# Patient Record
Sex: MALE | Race: BLACK OR AFRICAN AMERICAN | Employment: OTHER | URBAN - METROPOLITAN AREA
[De-identification: names, ages, dates, MRNs, and addresses within clinical notes are randomized per-mention and may not be internally consistent; named-entity substitution may affect disease eponyms.]

---

## 2017-08-29 ENCOUNTER — TELEPHONE (OUTPATIENT)
Dept: ORTHOPEDICS CLINIC | Facility: CLINIC | Age: 67
End: 2017-08-29

## 2017-08-29 ENCOUNTER — HOSPITAL ENCOUNTER (OUTPATIENT)
Age: 67
Discharge: HOME | End: 2017-08-29
Attending: EMERGENCY MEDICINE
Payer: MEDICARE

## 2017-08-29 VITALS
SYSTOLIC BLOOD PRESSURE: 153 MMHG | WEIGHT: 217 LBS | RESPIRATION RATE: 16 BRPM | HEART RATE: 75 BPM | BODY MASS INDEX: 31 KG/M2 | DIASTOLIC BLOOD PRESSURE: 83 MMHG | TEMPERATURE: 98 F | OXYGEN SATURATION: 99 %

## 2017-08-29 DIAGNOSIS — G89.29 CHRONIC PAIN OF RIGHT KNEE: ICD-10-CM

## 2017-08-29 DIAGNOSIS — Z01.10 ENCOUNTER FOR EXAMINATION OF EARS WITHOUT ABNORMAL FINDINGS: Primary | ICD-10-CM

## 2017-08-29 DIAGNOSIS — M25.561 CHRONIC PAIN OF RIGHT KNEE: ICD-10-CM

## 2017-08-29 PROCEDURE — 99212 OFFICE O/P EST SF 10 MIN: CPT

## 2017-08-29 NOTE — TELEPHONE ENCOUNTER
pt called. Right knee swollen and pain. Looks like fluid, no injury. Was seen today at [id number]. Please advise.

## 2017-08-29 NOTE — ED PROVIDER NOTES
Patient Seen in: [facility] In [address] [place] [address]    History   Patient presents with:  Ear Problem Pain (neurosensory)    Stated Complaint: Ears Plugged    HPI    The patient is a 69-year-old male with a history of chronic hearing loss and ti daily. Coconut Oil 1000 MG Oral Cap,  Take 2,000 mg by mouth daily. Venlafaxine HCl 37.5 MG Oral Tab,  Take 37.5 mg by mouth once daily.    tamsulosin HCl 0.4 MG Oral Cap,  TAKE ONE CAPSULE BY MOUTH AT BEDTIME   gabapentin 100 MG Oral Cap,  Take 3 cap and intact    ED Course   Labs Reviewed - No data to display    ============================================================  ED Course  ------------------------------------------------------------  MDM     Patient has chronic knee pain for which he needs

## 2017-08-29 NOTE — ED INITIAL ASSESSMENT (HPI)
Sent here for wax removal of ear and would like a work up on knees and arms . sent here by hearing aide people to have wax cleaned out and as long as here to check his chronic arthritis check

## 2017-08-29 NOTE — TELEPHONE ENCOUNTER
Spoke to pt. States right knee has swelling and pain. Went to [facility] today. No xrays taken. Onset 20 -30 yrs ago. States last week knee started swelling to twice size of other knee. Denies any recent injuries. States knee has stiffness.  Denies any numbness

## 2017-09-12 ENCOUNTER — HOSPITAL ENCOUNTER (OUTPATIENT)
Dept: GENERAL RADIOLOGY | Facility: HOSPITAL | Age: 67
Discharge: HOME | End: 2017-09-12
Attending: ORTHOPAEDIC SURGERY | Admitting: ORTHOPAEDIC SURGERY
Payer: MEDICARE

## 2017-09-12 ENCOUNTER — OFFICE VISIT (OUTPATIENT)
Dept: ORTHOPEDICS CLINIC | Facility: CLINIC | Age: 67
End: 2017-09-12

## 2017-09-12 DIAGNOSIS — M25.561 RIGHT KNEE PAIN, UNSPECIFIED CHRONICITY: ICD-10-CM

## 2017-09-12 DIAGNOSIS — M17.11 PRIMARY OSTEOARTHRITIS OF RIGHT KNEE: Primary | ICD-10-CM

## 2017-09-12 PROCEDURE — 73565 X-RAY EXAM OF KNEES: CPT | Performed by: ORTHOPAEDIC SURGERY

## 2017-09-12 PROCEDURE — 20610 DRAIN/INJ JOINT/BURSA W/O US: CPT | Performed by: ORTHOPAEDIC SURGERY

## 2017-09-12 PROCEDURE — 73560 X-RAY EXAM OF KNEE 1 OR 2: CPT | Performed by: ORTHOPAEDIC SURGERY

## 2017-09-12 PROCEDURE — 99214 OFFICE O/P EST MOD 30 MIN: CPT | Performed by: ORTHOPAEDIC SURGERY

## 2017-09-12 RX ORDER — MAGNESIUM OXIDE 400 MG (241.3 MG MAGNESIUM) TABLET
400 TABLET 2 TIMES DAILY
COMMUNITY

## 2017-09-12 RX ORDER — CHROMIUM PICOLINATE 200 MCG
CAPSULE ORAL
COMMUNITY

## 2017-09-12 NOTE — PROGRESS NOTES
Per verbal order from [facility], draw up 4ml of 1% lidocaine and 2ml of Kenalog 10 for cortisone injection to right knee.  Karla Camejo RN

## 2017-09-12 NOTE — PROGRESS NOTES
9/12/2017  Santos HashParade Sr.  61/1950  75year old   male  Girish Lackey MD    HPI:   Patient presents with:  Knee Pain: Right- pt states pain/swelling has been going on for yrs, but starting flaring up a  few wks ago. pt denies any injury.      [place] Snyder Tab Take 10 mg by mouth daily. Disp:  Rfl:    Omega 3 1000 MG Oral Cap Take  by mouth daily. Disp:  Rfl:    Coenzyme Q10 (COQ10) 100 MG Oral Cap Take  by mouth daily. Disp:  Rfl:    Coconut Oil 1000 MG Oral Cap Take 2,000 mg by mouth daily.    Disp:  Rfl: brisk capillary refill and 2+ distal pulses. Sensation is intact to light touch in superficial peroneal, deep peroneal, sural, saphenous, and tibial nerve distributions.   The patient has 5/5 strength in tibialis anterior, gastrocsoleus complex, EHL, and F

## 2017-12-13 ENCOUNTER — LAB ENCOUNTER (OUTPATIENT)
Dept: LAB | Age: 67
End: 2017-12-13
Attending: INTERNAL MEDICINE
Payer: MEDICARE

## 2017-12-13 DIAGNOSIS — Z01.818 PRE-OP TESTING: Primary | ICD-10-CM

## 2017-12-13 DIAGNOSIS — Z01.812 PRE-OPERATIVE LABORATORY EXAMINATION: ICD-10-CM

## 2017-12-13 PROCEDURE — 80053 COMPREHEN METABOLIC PANEL: CPT

## 2017-12-13 PROCEDURE — 36415 COLL VENOUS BLD VENIPUNCTURE: CPT

## 2017-12-13 PROCEDURE — 85025 COMPLETE CBC W/AUTO DIFF WBC: CPT

## 2017-12-13 PROCEDURE — 93005 ELECTROCARDIOGRAM TRACING: CPT

## 2017-12-13 PROCEDURE — 85610 PROTHROMBIN TIME: CPT

## 2017-12-13 PROCEDURE — 93010 ELECTROCARDIOGRAM REPORT: CPT | Performed by: INTERNAL MEDICINE

## 2017-12-13 PROCEDURE — 81003 URINALYSIS AUTO W/O SCOPE: CPT

## 2018-03-11 ENCOUNTER — HOSPITAL ENCOUNTER (EMERGENCY)
Facility: HOSPITAL | Age: 68
Discharge: HOME | End: 2018-03-11
Payer: MEDICARE

## 2018-03-11 ENCOUNTER — APPOINTMENT (OUTPATIENT)
Dept: GENERAL RADIOLOGY | Facility: HOSPITAL | Age: 68
End: 2018-03-11
Payer: MEDICARE

## 2018-03-11 VITALS
SYSTOLIC BLOOD PRESSURE: 129 MMHG | HEART RATE: 78 BPM | OXYGEN SATURATION: 99 % | TEMPERATURE: 98 F | BODY MASS INDEX: 35.16 KG/M2 | DIASTOLIC BLOOD PRESSURE: 90 MMHG | WEIGHT: 224 LBS | HEIGHT: 67 IN | RESPIRATION RATE: 18 BRPM

## 2018-03-11 DIAGNOSIS — M25.562 ARTHRALGIA OF LEFT LOWER LEG: Primary | ICD-10-CM

## 2018-03-11 PROCEDURE — 99283 EMERGENCY DEPT VISIT LOW MDM: CPT

## 2018-03-11 PROCEDURE — 73610 X-RAY EXAM OF ANKLE: CPT

## 2018-03-11 RX ORDER — PREGABALIN 75 MG/1
75 CAPSULE ORAL 2 TIMES DAILY
Qty: 60 CAPSULE | Refills: 0 | Status: SHIPPED | OUTPATIENT
Start: 2018-03-11 | End: 2018-04-10

## 2018-03-11 NOTE — ED INITIAL ASSESSMENT (HPI)
Pt here c/o pain to the outer left ankle area and side of leg x 3 wks. sts the pain is intermittent and usually lasts several minutes and goes away. The pain has continued and is more noticeable. Denies recent injury.   sts a couple days ago needed to us

## 2018-03-11 NOTE — ED NOTES
Pt is c/o left leg for the past 3 weeks, pt states his pain usually starts at 0400 in the morning and usually resolves on its own. Pt denies trauma, swelling or pain at this time. Pt is awake and alert. No acute distress noted. + pedal pulse.

## 2018-03-12 NOTE — ED PROVIDER NOTES
Patient Seen in: [facility] AND [facility] Emergency Department    History   Patient presents with:  Lower Extremity Injury (musculoskeletal)      HPI    Patient presents to the ED complaining of pain in his left lateral upper ankle area that occurs intermittent Right wrist splint     The patient does live in a home with stairs. ROS  Pertinent Positives: Leg pain  All other organ systems are reviewed and are negative. Constitutional and vital signs reviewed.       Social History and Family History acacia Height: 170.2 cm (5' 7\")      *I personally reviewed and interpreted all ED vitals.     Pulse Ox: 99%, Room air, Normal     Differential Diagnosis/ Diagnostic Considerations: Leg pain    Medical Record Review: I personally reviewed available prior medica

## 2018-06-18 ENCOUNTER — HOSPITAL ENCOUNTER (EMERGENCY)
Facility: HOSPITAL | Age: 68
Discharge: HOME | End: 2018-06-18
Payer: MEDICARE

## 2018-06-18 ENCOUNTER — APPOINTMENT (OUTPATIENT)
Dept: GENERAL RADIOLOGY | Facility: HOSPITAL | Age: 68
End: 2018-06-18
Payer: MEDICARE

## 2018-06-18 VITALS
TEMPERATURE: 98 F | DIASTOLIC BLOOD PRESSURE: 75 MMHG | SYSTOLIC BLOOD PRESSURE: 141 MMHG | BODY MASS INDEX: 33.49 KG/M2 | HEIGHT: 68 IN | OXYGEN SATURATION: 97 % | RESPIRATION RATE: 16 BRPM | HEART RATE: 68 BPM | WEIGHT: 221 LBS

## 2018-06-18 DIAGNOSIS — S90.112A CONTUSION OF LEFT GREAT TOE WITHOUT DAMAGE TO NAIL, INITIAL ENCOUNTER: ICD-10-CM

## 2018-06-18 DIAGNOSIS — S93.512A SPRAIN OF INTERPHALANGEAL JOINT OF LEFT GREAT TOE, INITIAL ENCOUNTER: Primary | ICD-10-CM

## 2018-06-18 PROCEDURE — 99283 EMERGENCY DEPT VISIT LOW MDM: CPT

## 2018-06-18 PROCEDURE — 73630 X-RAY EXAM OF FOOT: CPT

## 2018-06-18 NOTE — ED PROVIDER NOTES
Patient Seen in: [facility] AND [facility] Emergency Department    History   Patient presents with:  Lower Extremity Injury (musculoskeletal)    Stated Complaint: left foot pain    HPI    Patient presents because he did hit his first toe and just wanted to get i ASTAXANTHIN OR,  Take 12 mg by mouth daily. COLLAGEN OR,  Take 3 capsules by mouth 2 (two) times daily. Turmeric 500 MG Oral Cap,  Take 1,000 mg by mouth daily. Vitamin B-12 (VITAMIN B12) 250 MCG Oral Tab,  Take 250 mcg by mouth daily.    AmLODIPine B to the midfoot or metatarsal very tender to the IP joint less tenderness to the distal.  The nail is in place. No other trauma noted to the other toes  Skin:  Warm, dry, well perfused. Good skin turgor. No rashes seen.   Neurology:  Moving all extremitie

## 2018-06-18 NOTE — ED INITIAL ASSESSMENT (HPI)
Pt has a left toe injury from tripping over a vacuum cord. Pt denies LOC. Pt reports left great toe pain. Pt has toe bandaged and blood is noted.  Pt denies being on a blood thinner

## 2018-07-09 ENCOUNTER — TELEPHONE (OUTPATIENT)
Dept: NEUROLOGY | Facility: CLINIC | Age: 68
End: 2018-07-09

## 2019-07-17 ENCOUNTER — APPOINTMENT (OUTPATIENT)
Dept: GENERAL RADIOLOGY | Facility: HOSPITAL | Age: 69
End: 2019-07-17
Attending: EMERGENCY MEDICINE
Payer: MEDICARE

## 2019-07-17 ENCOUNTER — HOSPITAL ENCOUNTER (EMERGENCY)
Facility: HOSPITAL | Age: 69
Discharge: HOME | End: 2019-07-17
Attending: EMERGENCY MEDICINE
Payer: MEDICARE

## 2019-07-17 VITALS
TEMPERATURE: 98 F | HEART RATE: 60 BPM | SYSTOLIC BLOOD PRESSURE: 134 MMHG | WEIGHT: 219 LBS | DIASTOLIC BLOOD PRESSURE: 77 MMHG | RESPIRATION RATE: 20 BRPM | OXYGEN SATURATION: 95 % | BODY MASS INDEX: 33 KG/M2

## 2019-07-17 DIAGNOSIS — L50.0 ALLERGIC URTICARIA: ICD-10-CM

## 2019-07-17 DIAGNOSIS — K21.9 GASTROESOPHAGEAL REFLUX DISEASE WITHOUT ESOPHAGITIS: Primary | ICD-10-CM

## 2019-07-17 LAB
ANION GAP SERPL CALC-SCNC: 6 MMOL/L (ref 0–18)
BASOPHILS # BLD AUTO: 0.03 X10(3) UL (ref 0–0.2)
BASOPHILS NFR BLD AUTO: 0.3 %
BUN BLD-MCNC: 13 MG/DL (ref 7–18)
BUN/CREAT SERPL: 15.3 (ref 10–20)
CALCIUM BLD-MCNC: 8.9 MG/DL (ref 8.5–10.1)
CHLORIDE SERPL-SCNC: 114 MMOL/L (ref 98–112)
CO2 SERPL-SCNC: 26 MMOL/L (ref 21–32)
CREAT BLD-MCNC: 0.85 MG/DL (ref 0.7–1.3)
DEPRECATED RDW RBC AUTO: 43 FL (ref 35.1–46.3)
EOSINOPHIL # BLD AUTO: 0.11 X10(3) UL (ref 0–0.7)
EOSINOPHIL NFR BLD AUTO: 1.2 %
ERYTHROCYTE [DISTWIDTH] IN BLOOD BY AUTOMATED COUNT: 14.1 % (ref 11–15)
GLUCOSE BLD-MCNC: 102 MG/DL (ref 70–99)
HCT VFR BLD AUTO: 47 % (ref 39–53)
HGB BLD-MCNC: 14.8 G/DL (ref 13–17.5)
IMM GRANULOCYTES # BLD AUTO: 0.04 X10(3) UL (ref 0–1)
IMM GRANULOCYTES NFR BLD: 0.4 %
LYMPHOCYTES # BLD AUTO: 1.65 X10(3) UL (ref 1–4)
LYMPHOCYTES NFR BLD AUTO: 17.4 %
MCH RBC QN AUTO: 26.3 PG (ref 26–34)
MCHC RBC AUTO-ENTMCNC: 31.5 G/DL (ref 31–37)
MCV RBC AUTO: 83.6 FL (ref 80–100)
MONOCYTES # BLD AUTO: 0.55 X10(3) UL (ref 0.1–1)
MONOCYTES NFR BLD AUTO: 5.8 %
NEUTROPHILS # BLD AUTO: 7.1 X10 (3) UL (ref 1.5–7.7)
NEUTROPHILS # BLD AUTO: 7.1 X10(3) UL (ref 1.5–7.7)
NEUTROPHILS NFR BLD AUTO: 74.9 %
OSMOLALITY SERPL CALC.SUM OF ELEC: 302 MOSM/KG (ref 275–295)
PLATELET # BLD AUTO: 173 10(3)UL (ref 150–450)
POTASSIUM SERPL-SCNC: 3.8 MMOL/L (ref 3.5–5.1)
RBC # BLD AUTO: 5.62 X10(6)UL (ref 3.8–5.8)
SODIUM SERPL-SCNC: 146 MMOL/L (ref 136–145)
TROPONIN I SERPL-MCNC: <0.045 NG/ML (ref ?–0.04)
TROPONIN I SERPL-MCNC: <0.045 NG/ML (ref ?–0.04)
WBC # BLD AUTO: 9.5 X10(3) UL (ref 4–11)

## 2019-07-17 PROCEDURE — 84484 ASSAY OF TROPONIN QUANT: CPT | Performed by: EMERGENCY MEDICINE

## 2019-07-17 PROCEDURE — 96374 THER/PROPH/DIAG INJ IV PUSH: CPT

## 2019-07-17 PROCEDURE — 80048 BASIC METABOLIC PNL TOTAL CA: CPT | Performed by: EMERGENCY MEDICINE

## 2019-07-17 PROCEDURE — 99284 EMERGENCY DEPT VISIT MOD MDM: CPT

## 2019-07-17 PROCEDURE — 93010 ELECTROCARDIOGRAM REPORT: CPT | Performed by: EMERGENCY MEDICINE

## 2019-07-17 PROCEDURE — 71045 X-RAY EXAM CHEST 1 VIEW: CPT | Performed by: EMERGENCY MEDICINE

## 2019-07-17 PROCEDURE — 96375 TX/PRO/DX INJ NEW DRUG ADDON: CPT

## 2019-07-17 PROCEDURE — C9113 INJ PANTOPRAZOLE SODIUM, VIA: HCPCS | Performed by: EMERGENCY MEDICINE

## 2019-07-17 PROCEDURE — 85025 COMPLETE CBC W/AUTO DIFF WBC: CPT | Performed by: EMERGENCY MEDICINE

## 2019-07-17 PROCEDURE — 93005 ELECTROCARDIOGRAM TRACING: CPT

## 2019-07-17 RX ORDER — LIDOCAINE HYDROCHLORIDE 20 MG/ML
10 SOLUTION OROPHARYNGEAL ONCE
Status: COMPLETED | OUTPATIENT
Start: 2019-07-17 | End: 2019-07-17

## 2019-07-17 RX ORDER — METHYLPREDNISOLONE 4 MG/1
TABLET ORAL
Qty: 1 PACKAGE | Refills: 0 | Status: ON HOLD | OUTPATIENT
Start: 2019-07-17 | End: 2019-07-23

## 2019-07-17 RX ORDER — DIPHENHYDRAMINE HYDROCHLORIDE 50 MG/ML
25 INJECTION INTRAMUSCULAR; INTRAVENOUS ONCE
Status: COMPLETED | OUTPATIENT
Start: 2019-07-17 | End: 2019-07-17

## 2019-07-17 RX ORDER — MAGNESIUM HYDROXIDE/ALUMINUM HYDROXICE/SIMETHICONE 120; 1200; 1200 MG/30ML; MG/30ML; MG/30ML
30 SUSPENSION ORAL ONCE
Status: COMPLETED | OUTPATIENT
Start: 2019-07-17 | End: 2019-07-17

## 2019-07-17 RX ORDER — DIPHENHYDRAMINE HCL 25 MG
50 CAPSULE ORAL EVERY 6 HOURS PRN
Qty: 40 CAPSULE | Refills: 0 | Status: ON HOLD | OUTPATIENT
Start: 2019-07-17 | End: 2019-07-23

## 2019-07-17 NOTE — ED NOTES
Assumed care of patient from triage. Patient reports chest pain from sternum to left side of chest since Sunday, worse with drinking liquids. Patient reports using increased naima seltzer with only minimal relief.  Patient is alert and oriented x4, in no curt

## 2019-07-17 NOTE — ED INITIAL ASSESSMENT (HPI)
Pt c/o intermittent dizziness x1 month and right sided CP x2-3 days. He states that his chest feels tight.

## 2019-07-17 NOTE — ED NOTES
Pt complaining of itchiness all over , Dr Juan Jose Enriquez aware, give verbal order to medicate pt with Benadryl 25 mgs IV.

## 2019-07-17 NOTE — ED PROVIDER NOTES
Patient Seen in: [facility] AND [facility] Emergency Department    History   Patient presents with:  Chest Pain Angina (cardiovascular)    Stated Complaint: chest pain     HPI    26-year-old male without prior cardiac history who had a normal stress test reporte supple. No noted JVD. Cardiovascular: Normal rate, regular rhythm and intact and equal distal pulses. Pulmonary/Chest: Effort normal. No respiratory distress. Clear and equal BS b/l. Abdominal: Soft. There is no tenderness. There is no guarding.    Mammie Common been electronically signed and verified by the Radiologist whose name is printed above. DD:  07/17/2019/DT:  07/17/2019      MDM   Patient's pain somewhat atypical and relieved with GI medicine. EKG and troponin x2- and grossly unchanged from prior.   P

## 2019-07-22 ENCOUNTER — HOSPITAL ENCOUNTER (OUTPATIENT)
Facility: HOSPITAL | Age: 69
Setting detail: OBSERVATION
LOS: 1 days | Discharge: HOME | End: 2019-07-23
Attending: EMERGENCY MEDICINE | Admitting: HOSPITALIST
Payer: MEDICARE

## 2019-07-22 DIAGNOSIS — R22.0 TONGUE SWELLING: Primary | ICD-10-CM

## 2019-07-22 LAB
ANION GAP SERPL CALC-SCNC: 7 MMOL/L (ref 0–18)
BASOPHILS # BLD AUTO: 0.03 X10(3) UL (ref 0–0.2)
BASOPHILS NFR BLD AUTO: 0.2 %
BUN BLD-MCNC: 10 MG/DL (ref 7–18)
BUN/CREAT SERPL: 12 (ref 10–20)
C4 SERPL-MCNC: 34 MG/DL (ref 10–40)
CALCIUM BLD-MCNC: 9.2 MG/DL (ref 8.5–10.1)
CHLORIDE SERPL-SCNC: 109 MMOL/L (ref 98–112)
CO2 SERPL-SCNC: 26 MMOL/L (ref 21–32)
CREAT BLD-MCNC: 0.83 MG/DL (ref 0.7–1.3)
DEPRECATED RDW RBC AUTO: 42.2 FL (ref 35.1–46.3)
EOSINOPHIL # BLD AUTO: 0.04 X10(3) UL (ref 0–0.7)
EOSINOPHIL NFR BLD AUTO: 0.3 %
ERYTHROCYTE [DISTWIDTH] IN BLOOD BY AUTOMATED COUNT: 14.9 % (ref 11–15)
GLUCOSE BLD-MCNC: 98 MG/DL (ref 70–99)
HCT VFR BLD AUTO: 52.3 % (ref 39–53)
HGB BLD-MCNC: 16.7 G/DL (ref 13–17.5)
IMM GRANULOCYTES # BLD AUTO: 0.17 X10(3) UL (ref 0–1)
IMM GRANULOCYTES NFR BLD: 1.2 %
LYMPHOCYTES # BLD AUTO: 1.99 X10(3) UL (ref 1–4)
LYMPHOCYTES NFR BLD AUTO: 14.6 %
MCH RBC QN AUTO: 26.6 PG (ref 26–34)
MCHC RBC AUTO-ENTMCNC: 31.9 G/DL (ref 31–37)
MCV RBC AUTO: 83.1 FL (ref 80–100)
MONOCYTES # BLD AUTO: 0.62 X10(3) UL (ref 0.1–1)
MONOCYTES NFR BLD AUTO: 4.5 %
NEUTROPHILS # BLD AUTO: 10.78 X10 (3) UL (ref 1.5–7.7)
NEUTROPHILS # BLD AUTO: 10.78 X10(3) UL (ref 1.5–7.7)
NEUTROPHILS NFR BLD AUTO: 79.2 %
OSMOLALITY SERPL CALC.SUM OF ELEC: 293 MOSM/KG (ref 275–295)
PLATELET # BLD AUTO: 214 10(3)UL (ref 150–450)
POTASSIUM SERPL-SCNC: 3.8 MMOL/L (ref 3.5–5.1)
RBC # BLD AUTO: 6.29 X10(6)UL (ref 3.8–5.8)
SODIUM SERPL-SCNC: 142 MMOL/L (ref 136–145)
TSI SER-ACNC: 0.39 MIU/ML (ref 0.36–3.74)
WBC # BLD AUTO: 13.6 X10(3) UL (ref 4–11)

## 2019-07-22 PROCEDURE — 99222 1ST HOSP IP/OBS MODERATE 55: CPT | Performed by: ALLERGY & IMMUNOLOGY

## 2019-07-22 PROCEDURE — 99220 INITIAL OBSERVATION CARE,LEVL III: CPT | Performed by: HOSPITALIST

## 2019-07-22 RX ORDER — AMLODIPINE BESYLATE 10 MG/1
10 TABLET ORAL DAILY
Status: DISCONTINUED | OUTPATIENT
Start: 2019-07-22 | End: 2019-07-23

## 2019-07-22 RX ORDER — METOCLOPRAMIDE HYDROCHLORIDE 5 MG/ML
10 INJECTION INTRAMUSCULAR; INTRAVENOUS EVERY 8 HOURS PRN
Status: DISCONTINUED | OUTPATIENT
Start: 2019-07-22 | End: 2019-07-23

## 2019-07-22 RX ORDER — FAMOTIDINE 10 MG/ML
20 INJECTION, SOLUTION INTRAVENOUS 2 TIMES DAILY
Status: DISCONTINUED | OUTPATIENT
Start: 2019-07-22 | End: 2019-07-23

## 2019-07-22 RX ORDER — METHYLPREDNISOLONE SODIUM SUCCINATE 125 MG/2ML
125 INJECTION, POWDER, LYOPHILIZED, FOR SOLUTION INTRAMUSCULAR; INTRAVENOUS ONCE
Status: COMPLETED | OUTPATIENT
Start: 2019-07-22 | End: 2019-07-22

## 2019-07-22 RX ORDER — ONDANSETRON 2 MG/ML
4 INJECTION INTRAMUSCULAR; INTRAVENOUS EVERY 6 HOURS PRN
Status: DISCONTINUED | OUTPATIENT
Start: 2019-07-22 | End: 2019-07-23

## 2019-07-22 RX ORDER — CETIRIZINE HYDROCHLORIDE 10 MG/1
10 TABLET ORAL 2 TIMES DAILY
Status: DISCONTINUED | OUTPATIENT
Start: 2019-07-22 | End: 2019-07-23

## 2019-07-22 RX ORDER — POLYETHYLENE GLYCOL 3350 17 G/17G
17 POWDER, FOR SOLUTION ORAL DAILY PRN
Status: DISCONTINUED | OUTPATIENT
Start: 2019-07-22 | End: 2019-07-23

## 2019-07-22 RX ORDER — DIPHENHYDRAMINE HYDROCHLORIDE 50 MG/ML
25 INJECTION INTRAMUSCULAR; INTRAVENOUS ONCE
Status: COMPLETED | OUTPATIENT
Start: 2019-07-22 | End: 2019-07-22

## 2019-07-22 RX ORDER — SODIUM CHLORIDE 0.9 % (FLUSH) 0.9 %
3 SYRINGE (ML) INJECTION AS NEEDED
Status: DISCONTINUED | OUTPATIENT
Start: 2019-07-22 | End: 2019-07-23

## 2019-07-22 RX ORDER — ACETAMINOPHEN 325 MG/1
650 TABLET ORAL EVERY 6 HOURS PRN
Status: DISCONTINUED | OUTPATIENT
Start: 2019-07-22 | End: 2019-07-23

## 2019-07-22 RX ORDER — DIPHENHYDRAMINE HYDROCHLORIDE 50 MG/ML
25 INJECTION INTRAMUSCULAR; INTRAVENOUS EVERY 4 HOURS PRN
Status: DISCONTINUED | OUTPATIENT
Start: 2019-07-22 | End: 2019-07-23

## 2019-07-22 RX ORDER — METHYLPREDNISOLONE SODIUM SUCCINATE 40 MG/ML
40 INJECTION, POWDER, LYOPHILIZED, FOR SOLUTION INTRAMUSCULAR; INTRAVENOUS EVERY 8 HOURS
Status: DISCONTINUED | OUTPATIENT
Start: 2019-07-22 | End: 2019-07-23

## 2019-07-22 RX ORDER — FAMOTIDINE 10 MG/ML
20 INJECTION, SOLUTION INTRAVENOUS ONCE
Status: COMPLETED | OUTPATIENT
Start: 2019-07-22 | End: 2019-07-22

## 2019-07-22 NOTE — H&P
[facility] [facility] Patient Status:  Inpatient    1950 MRN [id number]   Location [facility] 3W/SW Attending Veronica Reynolds MD   Hosp Day # 0 PCP Nicolas Joe MD     Date:  2019 week      Comment: 2 times month    Drug use: No    Allergies/Medications: Allergies: No Known Allergies    Medications Prior to Admission:  Lansoprazole 30 MG Oral Tablet Dispersible Take 1 tablet (30 mg total) by mouth daily for 14 days.    methylPREDNI movement on inspiration  HEART:  S1 and S2 heard. RRR   LUNGS:  Air entry was good. No crackles or wheezes   ABDOMEN: Soft and non-tender. Bowel sounds were present. MUSCULOSKELETAL:  There was no deformity.   There was full range of motion in all the e

## 2019-07-22 NOTE — ED NOTES
Pt reports visible swelling to left side of tongue; appears to be a hive starting on tongue. Pt denies difficulty breathing or swallowing. Denies throat swelling or voice changes at this time. Tolerating p.o. Fluids and own secretions.  Throat is open and b

## 2019-07-22 NOTE — PLAN OF CARE
Problem: Patient Centered Care  Goal: Patient preferences are identified and integrated in the patient's plan of care  Description  Interventions:  - What would you like us to know as we care for you?  I have been coming to the hospital multiple times for Progressing     Problem: RESPIRATORY - ADULT  Goal: Achieves optimal ventilation and oxygenation  Description  INTERVENTIONS:  - Assess for changes in respiratory status  - Assess for changes in mentation and behavior  - Position to facilitate oxygenation symptoms of bleeding or hemorrhage  - Monitor labs and vital signs for trends  - Administer supportive blood products/factors, fluids and medications as ordered and appropriate  - Administer supportive blood products/factors as ordered and appropriate  Out

## 2019-07-22 NOTE — ED INITIAL ASSESSMENT (HPI)
Pt reports swelling to tongue and hives since last night. Pt suspects symptoms may be related to recent visit for chest pain, however no new medications taken by pt.

## 2019-07-23 VITALS
OXYGEN SATURATION: 96 % | TEMPERATURE: 98 F | DIASTOLIC BLOOD PRESSURE: 77 MMHG | HEIGHT: 68 IN | RESPIRATION RATE: 18 BRPM | SYSTOLIC BLOOD PRESSURE: 130 MMHG | HEART RATE: 80 BPM | WEIGHT: 221.88 LBS | BODY MASS INDEX: 33.63 KG/M2

## 2019-07-23 LAB
ANION GAP SERPL CALC-SCNC: 7 MMOL/L (ref 0–18)
BASOPHILS # BLD AUTO: 0.02 X10(3) UL (ref 0–0.2)
BASOPHILS NFR BLD AUTO: 0.1 %
BUN BLD-MCNC: 18 MG/DL (ref 7–18)
BUN/CREAT SERPL: 23.7 (ref 10–20)
CALCIUM BLD-MCNC: 9.5 MG/DL (ref 8.5–10.1)
CHLORIDE SERPL-SCNC: 107 MMOL/L (ref 98–112)
CO2 SERPL-SCNC: 25 MMOL/L (ref 21–32)
CREAT BLD-MCNC: 0.76 MG/DL (ref 0.7–1.3)
DEPRECATED RDW RBC AUTO: 41.5 FL (ref 35.1–46.3)
EOSINOPHIL # BLD AUTO: 0 X10(3) UL (ref 0–0.7)
EOSINOPHIL NFR BLD AUTO: 0 %
ERYTHROCYTE [DISTWIDTH] IN BLOOD BY AUTOMATED COUNT: 13.9 % (ref 11–15)
GLUCOSE BLD-MCNC: 154 MG/DL (ref 70–99)
HAV IGM SER QL: 2.7 MG/DL (ref 1.6–2.6)
HCT VFR BLD AUTO: 46.8 % (ref 39–53)
HGB BLD-MCNC: 15 G/DL (ref 13–17.5)
IMM GRANULOCYTES # BLD AUTO: 0.14 X10(3) UL (ref 0–1)
IMM GRANULOCYTES NFR BLD: 0.8 %
LYMPHOCYTES # BLD AUTO: 1.35 X10(3) UL (ref 1–4)
LYMPHOCYTES NFR BLD AUTO: 8.1 %
MCH RBC QN AUTO: 26.5 PG (ref 26–34)
MCHC RBC AUTO-ENTMCNC: 32.1 G/DL (ref 31–37)
MCV RBC AUTO: 82.5 FL (ref 80–100)
MONOCYTES # BLD AUTO: 0.48 X10(3) UL (ref 0.1–1)
MONOCYTES NFR BLD AUTO: 2.9 %
NEUTROPHILS # BLD AUTO: 14.64 X10 (3) UL (ref 1.5–7.7)
NEUTROPHILS # BLD AUTO: 14.64 X10(3) UL (ref 1.5–7.7)
NEUTROPHILS NFR BLD AUTO: 88.1 %
OSMOLALITY SERPL CALC.SUM OF ELEC: 293 MOSM/KG (ref 275–295)
PEANUT IGE QN: <0.1 KUA/L (ref ?–0.1)
PLATELET # BLD AUTO: 218 10(3)UL (ref 150–450)
POTASSIUM SERPL-SCNC: 4.1 MMOL/L (ref 3.5–5.1)
RBC # BLD AUTO: 5.67 X10(6)UL (ref 3.8–5.8)
SODIUM SERPL-SCNC: 139 MMOL/L (ref 136–145)
WBC # BLD AUTO: 16.6 X10(3) UL (ref 4–11)

## 2019-07-23 PROCEDURE — 99217 OBSERVATION CARE DISCHARGE: CPT | Performed by: HOSPITALIST

## 2019-07-23 RX ORDER — CETIRIZINE HYDROCHLORIDE 10 MG/1
10 TABLET ORAL 2 TIMES DAILY
Qty: 60 TABLET | Refills: 0 | Status: SHIPPED | OUTPATIENT
Start: 2019-07-23 | End: 2019-08-22

## 2019-07-23 RX ORDER — FAMOTIDINE 20 MG/1
20 TABLET ORAL 2 TIMES DAILY
Qty: 60 TABLET | Refills: 0 | Status: SHIPPED | OUTPATIENT
Start: 2019-07-23 | End: 2019-08-22

## 2019-07-23 RX ORDER — EPINEPHRINE 0.3 MG/.3ML
0.3 INJECTION SUBCUTANEOUS
Qty: 1 EACH | Refills: 0 | Status: SHIPPED | OUTPATIENT
Start: 2019-07-23 | End: 2021-03-11

## 2019-07-23 RX ORDER — DIPHENHYDRAMINE HCL 25 MG
50 CAPSULE ORAL EVERY 6 HOURS PRN
Qty: 40 CAPSULE | Refills: 0 | Status: SHIPPED | OUTPATIENT
Start: 2019-07-23 | End: 2019-07-28

## 2019-07-23 RX ORDER — PREDNISONE 10 MG/1
10 TABLET ORAL SEE ADMIN INSTRUCTIONS
Qty: 38 TABLET | Refills: 0 | Status: SHIPPED | OUTPATIENT
Start: 2019-07-23 | End: 2021-03-11 | Stop reason: ALTCHOICE

## 2019-07-23 RX ORDER — MAGNESIUM OXIDE 400 MG (241.3 MG MAGNESIUM) TABLET
400 TABLET 2 TIMES DAILY WITH MEALS
Status: DISCONTINUED | OUTPATIENT
Start: 2019-07-23 | End: 2019-07-23

## 2019-07-23 NOTE — CM/SW NOTE
COND $$: Patient failed Inpatient criteria. Second level of review completed and supports Observation. UR committee in agreement. Discussed with Dr Dayanna Pierson approves.

## 2019-07-23 NOTE — CONSULTS
Julian Wetzel is a 70year old male. HPI:   Patient presents with:  Swelling    Tongue swelling and hives     Patient reported symptoms started with pruritus and a knot/papule on his right temple 2 weeks ago .   Pruritus persisted over the next we Unspecified essential hypertension       Past Surgical History:   Procedure Laterality Date   • ANESTH,SURGERY OF SHOULDER      right shoulder   • CATARACT      left eye   • CORRECT BUNION,SIMPLE      left foot      Family History   Problem Relation Age of tympanic membranes are normal bilaterally hearing is grossly intact  Nose/Mouth/Throat: nose and throat are clear mucous membranes are moist   Neck/Thyroid: neck is supple without adenopathy  Lymphatic: no abnormal cervical, supraclavicular or axillary kati Referrals:  7/22/2019  Junior Herrera MD      If medication samples were provided today, they were provided solely for patient education and training related to self administration of these medications.   Teaching, instruction and sample was provided to

## 2019-07-23 NOTE — PLAN OF CARE
Problem: Patient Centered Care  Goal: Patient preferences are identified and integrated in the patient's plan of care  Description  Interventions:  - What would you like us to know as we care for you?  I have been coming to the hospital multiple times for appropriate  Outcome: Adequate for Discharge     Problem: RESPIRATORY - ADULT  Goal: Achieves optimal ventilation and oxygenation  Description  INTERVENTIONS:  - Assess for changes in respiratory status  - Assess for changes in mentation and behavior  - Po hematologic stability  Description  INTERVENTIONS  - Assess for signs and symptoms of bleeding or hemorrhage  - Monitor labs and vital signs for trends  - Administer supportive blood products/factors, fluids and medications as ordered and appropriate  - Ad

## 2019-07-23 NOTE — DISCHARGE SUMMARY
[facility]    Discharge Summary    Bakari Vick. Patient Status:  Observation    1950 MRN [id number]   Location [facility] 3W/SW Attending Kee Lopez MD   Hosp Day # 1 PCP Yamel Valencia MD     Date of Admissi benadryl  Given iv bendadryl and 125 mg of solu-medrol. Hospital Course:      Allergic Reaction  -with hive and angioedema  -curt allergy eval  -? idiopathich  -C4 and TSH wnl  -tryptase levels and serum igE panel pending  -home with pepcid bid, prednison ASTAXANTHIN OR      Take 12 mg by mouth daily. Refills:  0     Chromium Picolinate 200 MCG Caps      Take by mouth. Refills:  0     CoQ10 100 MG Caps      Take by mouth every 30 (thirty) days.    Refills:  0     GLUCOSAMINE CHONDR COMPLEX OR      Take

## 2019-07-25 ENCOUNTER — TELEPHONE (OUTPATIENT)
Dept: ALLERGY | Facility: CLINIC | Age: 69
End: 2019-07-25

## 2019-07-25 LAB
A ALTERNATA IGE QN: 0.1 KUA/L (ref ?–0.1)
A FUMIGATUS IGE QN: <0.1 KUA/L (ref ?–0.1)
AMER SYCAMORE IGE QN: <0.1 KUA/L (ref ?–0.1)
BERMUDA GRASS IGE QN: <0.1 KUA/L (ref ?–0.1)
BOXELDER IGE QN: <0.1 KUA/L (ref ?–0.1)
C HERBARUM IGE QN: <0.1 KUA/L (ref ?–0.1)
CALIF WALNUT IGE QN: 0.1 KUA/L (ref ?–0.1)
CAT DANDER IGE QN: <0.1 KUA/L (ref ?–0.1)
CMN PIGWEED IGE QN: <0.1 KUA/L (ref ?–0.1)
COMMON RAGWEED IGE QN: <0.1 KUA/L (ref ?–0.1)
COTTONWOOD IGE QN: <0.1 KUA/L (ref ?–0.1)
D FARINAE IGE QN: <0.1 KUA/L (ref ?–0.1)
D PTERONYSS IGE QN: <0.1 KUA/L (ref ?–0.1)
DOG DANDER IGE QN: <0.1 KUA/L (ref ?–0.1)
IGE SERPL-ACNC: 56.3 KU/L (ref 2–214)
M RACEMOSUS IGE QN: <0.1 KUA/L (ref ?–0.1)
MARSH ELDER IGE QN: <0.1 KUA/L (ref ?–0.1)
MOUSE EPITH IGE QN: <0.1 KUA/L (ref ?–0.1)
MT JUNIPER IGE QN: <0.1 KUA/L (ref ?–0.1)
P NOTATUM IGE QN: <0.1 KUA/L (ref ?–0.1)
PECAN/HICK TREE IGE QN: <0.1 KUA/L (ref ?–0.1)
ROACH IGE QN: <0.1 KUA/L (ref ?–0.1)
SALTWORT IGE QN: <0.1 KUA/L (ref ?–0.1)
TIMOTHY IGE QN: 0.17 KUA/L (ref ?–0.1)
TRYPTASE: 6.8 UG/L
WHITE ASH IGE QN: 0.29 KUA/L (ref ?–0.1)
WHITE ELM IGE QN: 0.12 KUA/L (ref ?–0.1)
WHITE MULBERRY IGE QN: <0.1 KUA/L (ref ?–0.1)
WHITE OAK IGE QN: <0.1 KUA/L (ref ?–0.1)

## 2019-07-25 NOTE — TELEPHONE ENCOUNTER
LM asking patient to return our phone call regarding Dr. Freya Javier message below. Notified him we are in office tonight until 4 pm, and will otherwise return at 0800 Saturday.

## 2019-07-25 NOTE — TELEPHONE ENCOUNTER
Pt returned call. Pt given results and advice per Dr. Sonya Orozco below.   Pt verbalized understanding and scheduled first available consult appt for Monday 8/19/2019 at 3 pm. Pt instructed if possible to not take antihistamines (if able to tolerate not being on

## 2019-07-25 NOTE — TELEPHONE ENCOUNTER
----- Message from Elie Blandon MD sent at 7/25/2019 10:03 AM CDT -----  Please call patient with recent serum Ig testing to common indoor and outdoor environmental allergens. Patient did show class 0/I sensitization to tree grass and mold.   These are

## 2019-08-02 ENCOUNTER — LAB ENCOUNTER (OUTPATIENT)
Dept: LAB | Age: 69
End: 2019-08-02
Attending: INTERNAL MEDICINE
Payer: MEDICARE

## 2019-08-02 DIAGNOSIS — R00.2 PALPITATION: Primary | ICD-10-CM

## 2019-08-02 DIAGNOSIS — E66.9 OBESITY: ICD-10-CM

## 2019-08-02 DIAGNOSIS — R07.9 CHEST PAIN: ICD-10-CM

## 2019-08-02 LAB
T3FREE SERPL-MCNC: 1.84 PG/ML (ref 2.4–4.2)
T4 FREE SERPL-MCNC: 1 NG/DL (ref 0.8–1.7)
TSI SER-ACNC: 1.35 MIU/ML (ref 0.36–3.74)

## 2019-08-02 PROCEDURE — 84439 ASSAY OF FREE THYROXINE: CPT

## 2019-08-02 PROCEDURE — 36415 COLL VENOUS BLD VENIPUNCTURE: CPT

## 2019-08-02 PROCEDURE — 84443 ASSAY THYROID STIM HORMONE: CPT

## 2019-08-02 PROCEDURE — 84481 FREE ASSAY (FT-3): CPT

## 2019-08-03 ENCOUNTER — HOSPITAL ENCOUNTER (EMERGENCY)
Facility: HOSPITAL | Age: 69
Discharge: HOME | End: 2019-08-03
Attending: EMERGENCY MEDICINE
Payer: MEDICARE

## 2019-08-03 VITALS
RESPIRATION RATE: 20 BRPM | HEART RATE: 79 BPM | SYSTOLIC BLOOD PRESSURE: 119 MMHG | TEMPERATURE: 99 F | OXYGEN SATURATION: 96 % | BODY MASS INDEX: 33 KG/M2 | DIASTOLIC BLOOD PRESSURE: 74 MMHG | WEIGHT: 217 LBS

## 2019-08-03 DIAGNOSIS — T78.40XA ALLERGIC REACTION, INITIAL ENCOUNTER: Primary | ICD-10-CM

## 2019-08-03 PROCEDURE — 99284 EMERGENCY DEPT VISIT MOD MDM: CPT

## 2019-08-03 PROCEDURE — 96374 THER/PROPH/DIAG INJ IV PUSH: CPT

## 2019-08-03 PROCEDURE — 96375 TX/PRO/DX INJ NEW DRUG ADDON: CPT

## 2019-08-03 PROCEDURE — S0028 INJECTION, FAMOTIDINE, 20 MG: HCPCS | Performed by: EMERGENCY MEDICINE

## 2019-08-03 RX ORDER — DIPHENHYDRAMINE HYDROCHLORIDE 50 MG/ML
25 INJECTION INTRAMUSCULAR; INTRAVENOUS ONCE
Status: COMPLETED | OUTPATIENT
Start: 2019-08-03 | End: 2019-08-03

## 2019-08-03 RX ORDER — PREDNISONE 10 MG/1
TABLET ORAL
Qty: 64 TABLET | Refills: 0 | Status: SHIPPED | OUTPATIENT
Start: 2019-08-03 | End: 2021-03-11

## 2019-08-03 RX ORDER — METHYLPREDNISOLONE SODIUM SUCCINATE 125 MG/2ML
125 INJECTION, POWDER, LYOPHILIZED, FOR SOLUTION INTRAMUSCULAR; INTRAVENOUS ONCE
Status: COMPLETED | OUTPATIENT
Start: 2019-08-03 | End: 2019-08-03

## 2019-08-03 RX ORDER — FAMOTIDINE 10 MG/ML
20 INJECTION, SOLUTION INTRAVENOUS ONCE
Status: COMPLETED | OUTPATIENT
Start: 2019-08-03 | End: 2019-08-03

## 2019-08-03 NOTE — ED NOTES
Discharge instructions reviewed. Pt verbalized understanding with no further questions. Pain controlled. Steady gait. Speaking in full clear sentences at discharge. Scripts given to patient with education for new medication therapy.

## 2019-08-03 NOTE — ED NOTES
Pt alert and interactive. Generalized rash with hives noted. Itching. Denies sob, throat swelling. Speaking in full clear sentences. Recently hospitalized for allergic reaction.  Unknown what he is allergic too, but believes it to be environmental.

## 2019-08-03 NOTE — ED INITIAL ASSESSMENT (HPI)
Pt had episode of HARDIK, and N/V/D while trying to go to sleep tonight. States he had to use epi-pen, third time he has had to use an epi-pen this month. Unknown allergies. Pt states he feels better currently, no apparent distress.

## 2019-08-03 NOTE — ED PROVIDER NOTES
Patient Seen in: [facility] AND [facility] Emergency Department    History   Patient presents with: Allergic Rxn Allergies (immune)    Stated Complaint:     HPI    77 yo male with about one month of waxing and waning allergic reaction symptoms.  Is currently stefany round, and reactive to light. Conjunctivae and EOM are normal.   Neck: Normal range of motion. Neck supple. Cardiovascular: Normal rate, regular rhythm, normal heart sounds and intact distal pulses.    Pulmonary/Chest: Effort normal and breath sounds norm daily for four days. Then stop. Qty: 64 tablet Refills: 0    !! - Potential duplicate medications found. Please discuss with provider.

## 2019-08-19 ENCOUNTER — OFFICE VISIT (OUTPATIENT)
Dept: ALLERGY | Facility: CLINIC | Age: 69
End: 2019-08-19
Payer: MEDICARE

## 2019-08-19 ENCOUNTER — TELEPHONE (OUTPATIENT)
Dept: ALLERGY | Facility: CLINIC | Age: 69
End: 2019-08-19

## 2019-08-19 ENCOUNTER — APPOINTMENT (OUTPATIENT)
Dept: LAB | Age: 69
End: 2019-08-19
Attending: ALLERGY & IMMUNOLOGY
Payer: MEDICARE

## 2019-08-19 VITALS
TEMPERATURE: 99 F | BODY MASS INDEX: 31.73 KG/M2 | RESPIRATION RATE: 18 BRPM | SYSTOLIC BLOOD PRESSURE: 153 MMHG | HEIGHT: 70 IN | DIASTOLIC BLOOD PRESSURE: 94 MMHG | WEIGHT: 221.63 LBS | OXYGEN SATURATION: 98 % | HEART RATE: 111 BPM

## 2019-08-19 DIAGNOSIS — T78.3XXD ANGIOEDEMA, SUBSEQUENT ENCOUNTER: ICD-10-CM

## 2019-08-19 DIAGNOSIS — L50.1 CHRONIC IDIOPATHIC URTICARIA: ICD-10-CM

## 2019-08-19 DIAGNOSIS — J01.00 ACUTE NON-RECURRENT MAXILLARY SINUSITIS: ICD-10-CM

## 2019-08-19 DIAGNOSIS — T78.3XXD ANGIOEDEMA, SUBSEQUENT ENCOUNTER: Primary | ICD-10-CM

## 2019-08-19 LAB — C4 SERPL-MCNC: 32.2 MG/DL (ref 10–40)

## 2019-08-19 PROCEDURE — 36415 COLL VENOUS BLD VENIPUNCTURE: CPT

## 2019-08-19 PROCEDURE — G0463 HOSPITAL OUTPT CLINIC VISIT: HCPCS | Performed by: ALLERGY & IMMUNOLOGY

## 2019-08-19 PROCEDURE — 82785 ASSAY OF IGE: CPT

## 2019-08-19 PROCEDURE — 99214 OFFICE O/P EST MOD 30 MIN: CPT | Performed by: ALLERGY & IMMUNOLOGY

## 2019-08-19 PROCEDURE — 86160 COMPLEMENT ANTIGEN: CPT

## 2019-08-19 PROCEDURE — 86003 ALLG SPEC IGE CRUDE XTRC EA: CPT

## 2019-08-19 RX ORDER — RANITIDINE 150 MG/1
150 CAPSULE ORAL 2 TIMES DAILY
COMMUNITY
End: 2019-09-16

## 2019-08-19 RX ORDER — PREDNISONE 10 MG/1
10 TABLET ORAL DAILY
Qty: 30 TABLET | Refills: 0 | Status: SHIPPED | OUTPATIENT
Start: 2019-08-19 | End: 2021-03-11 | Stop reason: ALTCHOICE

## 2019-08-19 RX ORDER — AMOXICILLIN 500 MG/1
500 TABLET, FILM COATED ORAL 3 TIMES DAILY
Qty: 30 TABLET | Refills: 0 | Status: SHIPPED | OUTPATIENT
Start: 2019-08-19 | End: 2019-08-29

## 2019-08-19 RX ORDER — EPINEPHRINE 0.3 MG/.3ML
0.3 INJECTION SUBCUTANEOUS ONCE
Qty: 2 EACH | Refills: 1 | Status: SHIPPED | OUTPATIENT
Start: 2019-08-19 | End: 2019-08-19

## 2019-08-19 RX ORDER — IBUPROFEN 600 MG/1
600 TABLET ORAL EVERY 6 HOURS PRN
COMMUNITY

## 2019-08-19 RX ORDER — DIPHENHYDRAMINE HCL 25 MG
75 TABLET ORAL EVERY 6 HOURS PRN
COMMUNITY

## 2019-08-19 NOTE — PROGRESS NOTES
Mehrdad Jones is a 78year old male. HPI:   Patient presents with: Allergies: Has been to ER 5 times since July, and hospitalized once. Patient reports having to use EpiPen due to anaphylaxis. R eye swollen, closed, with thick yellow discharge. prior inhalers. Patient does question potential allergic rhinitis with year-round symptoms that worsen in the summertime and with pets. No history of eczema or food allergies.     Patient is a non-smoker. No pets no illicits. \"     Prior TSH on August 2 Cancer Sister         pancreatic cancer   • Cancer Brother         pancreatic cancer   • Dementia Other       Social History: Social History    Tobacco Use      Smoking status: Never Smoker      Smokeless tobacco: Never Used    Alcohol use:  Yes      Alcoho EPINEPHrine 0.3 MG/0.3ML Injection Solution Auto-injector Inject 0.3 mL (1 each total) into the muscle daily as needed. Disp: 1 each Rfl: 0   magnesium oxide 400 MG Oral Tab Take 400 mg by mouth 2 (two) times daily.    Disp:  Rfl:    Turmeric 500 MG Oral intact  Nose/Mouth/Throat: nose and throat are clear mucous membranes are moist   Neck/Thyroid: neck is supple without adenopathy  Lymphatic: no abnormal cervical, supraclavicular or axillary adenopathy is noted  Respiratory: normal to inspection lungs are Date   • Hyperlipidemia    • Kidney stones    • Other and unspecified hyperlipidemia    • Panic attacks    • Tinnitus     10 years    • Unspecified essential hypertension       Past Surgical History:   Procedure Laterality Date   • ANESTH,SURGERY OF SHOULD MG Oral Tab Take 1 tablet (10 mg total) by mouth 2 (two) times daily. Disp: 60 tablet Rfl: 0   famoTIDine (PEPCID) 20 MG Oral Tab Take 1 tablet (20 mg total) by mouth 2 (two) times daily.  Disp: 60 tablet Rfl: 0   predniSONE 10 MG Oral Tab Take 1 tablet (10 and vision loss  Gastrointestinal:  Negative for abdominal pain, diarrhea and vomiting  Integumentary:  Negative for pruritus and rash  Respiratory:  Negative for cough, dyspnea and wheezing    PHYSICAL EXAM:   Constitutional: responsive, no acute distress consider antibiotic eyedrops if refractory    Follow-up in approximately 1 month         Orders This Visit:  Orders Placed This Encounter      Complement C4, Serum      Adult Food Allergy Prof      Meds This Visit:  Requested Prescriptions     Signed Presc

## 2019-08-19 NOTE — TELEPHONE ENCOUNTER
Pt would like a printed RX for Epipen so he can shop around for the best price. Medication pended in meds and orders.  Dr Jailyn Varela please sign

## 2019-08-20 ENCOUNTER — TELEPHONE (OUTPATIENT)
Dept: ALLERGY | Facility: CLINIC | Age: 69
End: 2019-08-20

## 2019-08-21 LAB
CLAM IGE QN: <0.1 KUA/L (ref ?–0.1)
CODFISH IGE QN: <0.1 KUA/L (ref ?–0.1)
CORN IGE QN: <0.1 KUA/L (ref ?–0.1)
COW MILK IGE QN: <0.1 KUA/L (ref ?–0.1)
EGG WHITE IGE QN: <0.1 KUA/L (ref ?–0.1)
IGE SERPL-ACNC: 34.2 KU/L (ref 2–214)
PEANUT IGE QN: <0.1 KUA/L (ref ?–0.1)
SCALLOP IGE QN: <0.1 KUA/L (ref ?–0.1)
SESAME SEED IGE QN: <0.1 KUA/L (ref ?–0.1)
SHRIMP IGE QN: <0.1 KUA/L (ref ?–0.1)
SOYBEAN IGE QN: <0.1 KUA/L (ref ?–0.1)
WALNUT IGE QN: <0.1 KUA/L (ref ?–0.1)
WHEAT IGE QN: <0.1 KUA/L (ref ?–0.1)

## 2019-08-22 NOTE — TELEPHONE ENCOUNTER
Pt contacted, last name and  verified, and labs reviewed per Dr. Kwame Marina. Pt verbalized understanding, all questions answered and denied further questions at this time.

## 2019-08-22 NOTE — TELEPHONE ENCOUNTER
----- Message from Tiana Mclean MD sent at 8/21/2019  3:37 PM CDT -----  Please call patient with unremarkable serum IgE profile to common food allergens.

## 2019-09-16 ENCOUNTER — OFFICE VISIT (OUTPATIENT)
Dept: ALLERGY | Facility: CLINIC | Age: 69
End: 2019-09-16
Payer: MEDICARE

## 2019-09-16 ENCOUNTER — TELEPHONE (OUTPATIENT)
Dept: ALLERGY | Facility: CLINIC | Age: 69
End: 2019-09-16

## 2019-09-16 VITALS
SYSTOLIC BLOOD PRESSURE: 135 MMHG | TEMPERATURE: 98 F | OXYGEN SATURATION: 99 % | DIASTOLIC BLOOD PRESSURE: 89 MMHG | HEART RATE: 86 BPM

## 2019-09-16 DIAGNOSIS — L50.1 CHRONIC IDIOPATHIC URTICARIA: Primary | ICD-10-CM

## 2019-09-16 DIAGNOSIS — T78.3XXD ANGIOEDEMA, SUBSEQUENT ENCOUNTER: ICD-10-CM

## 2019-09-16 PROCEDURE — G0463 HOSPITAL OUTPT CLINIC VISIT: HCPCS | Performed by: ALLERGY & IMMUNOLOGY

## 2019-09-16 PROCEDURE — 99214 OFFICE O/P EST MOD 30 MIN: CPT | Performed by: ALLERGY & IMMUNOLOGY

## 2019-09-16 RX ORDER — PREDNISONE 10 MG/1
10 TABLET ORAL DAILY
Qty: 30 TABLET | Refills: 0 | Status: SHIPPED | OUTPATIENT
Start: 2019-09-16 | End: 2021-03-11

## 2019-09-16 RX ORDER — MONTELUKAST SODIUM 10 MG/1
10 TABLET ORAL NIGHTLY
Qty: 30 TABLET | Refills: 0 | Status: SHIPPED | OUTPATIENT
Start: 2019-09-16

## 2019-09-16 RX ORDER — ALBUTEROL SULFATE 90 UG/1
2 AEROSOL, METERED RESPIRATORY (INHALATION) EVERY 6 HOURS PRN
Qty: 1 INHALER | Refills: 0 | Status: SHIPPED | OUTPATIENT
Start: 2019-09-16 | End: 2021-03-11

## 2019-09-16 RX ORDER — LEVOCETIRIZINE DIHYDROCHLORIDE 5 MG/1
10 TABLET, FILM COATED ORAL EVERY EVENING
COMMUNITY

## 2019-09-16 RX ORDER — FAMOTIDINE 20 MG/1
20 TABLET ORAL 2 TIMES DAILY
COMMUNITY

## 2019-09-16 NOTE — PROGRESS NOTES
Ivonne Llamas is a 78year old male. HPI:   Patient presents with: Allergic Rxn Allergies (immune): Angioedema came back thursday night to friday morning. He had stopped steroids last week, and he had gotten them refilled.  He is currently taking eye   • CORRECT BUNION,SIMPLE      left foot      Family History   Problem Relation Age of Onset   • Cancer Father         thyroid cancer   • Diabetes Mother    • Cancer Sister         pancreatic cancer   • Cancer Brother         pancreatic cancer   • Baltazar daily.   Disp:  Rfl:    Omega 3-6-9 Fatty Acids (OMEGA 3-6-9 COMPLEX OR) Take by mouth. Disp:  Rfl:    Hyaluronic Acid-Vitamin C (HYALURONIC ACID OR) Take by mouth. Disp:  Rfl:    Multiple Vitamin (MULTI-VITAMIN) Oral Tab Take 1 tablet by mouth daily.  Disp No hives noted in office today   Extremities: no edema, cyanosis, or clubbing     ASSESSMENT/PLAN:   Assessment   Chronic idiopathic urticaria  (primary encounter diagnosis)  Angioedema, subsequent encounter      Recs:  Continue with Xyzal 5 mg twice a day

## 2019-09-16 NOTE — TELEPHONE ENCOUNTER
Per order of Dr. Kwame Marina, [employer] Patient Consent Form completed by pt and  Prescriber Service Form partially completed (Prescription information not completed) and placed on Dr. Alyse Taylor desk for review, completion and signature.

## 2019-09-16 NOTE — PATIENT INSTRUCTIONS
Recs:  Continue with Xyzal 5 mg twice a day. May titrate up to 4 times per day if needed  Start trial of Singulair, montelukast 10 mg once a day  Albuterol 2 puffs every 4-6 hours as needed  Prednisone  10 mg once a day with food.   Consider xolair for ciu

## 2019-09-17 NOTE — TELEPHONE ENCOUNTER
9/16/2019, completed [employer] Provider Services Form and Patient Consent faxed to [employer] at [phone]. Fax confirmation was received.      9/17/2019 in the morning fax received from Mary Méndez for referral to

## 2019-09-24 ENCOUNTER — TELEPHONE (OUTPATIENT)
Dept: ALLERGY | Facility: CLINIC | Age: 69
End: 2019-09-24

## 2019-09-24 NOTE — TELEPHONE ENCOUNTER
[employer] contacted at [phone], spoke with Case Mendel Hartshorn     Asked [place] if summary of benefits was faxed to physician office.  states that form was faxed, but to incorrect fax number on 9/17/2019.     Case Manage

## 2019-09-24 NOTE — TELEPHONE ENCOUNTER
Pt contacted. Pt states that [employer] contacted him and informed him that he was approved for Copay assistance and that he would like to schedule appt for Xolair. Pt informed that RN is awaiting benefit summary from [employer].   Once

## 2019-09-26 NOTE — TELEPHONE ENCOUNTER
[employer] contacted at [phone]. Spoke with Raymond Reid, [employer]. Requested that Summary of Benefits be refaxed to [phone] as summary of benefits has yet to be received via fax.      Raymond Reid offers that he is faxing Sum

## 2019-09-27 NOTE — TELEPHONE ENCOUNTER
Dr. Delfin Adams, Benefits Summary Received. Please print pended Xolair Order    And sign Xolair Order Set.

## 2019-09-30 PROBLEM — L50.1 CHRONIC IDIOPATHIC URTICARIA: Status: ACTIVE | Noted: 2019-09-30

## 2019-09-30 NOTE — TELEPHONE ENCOUNTER
Summary of Benefits received from [employer], Printed Rx for Xolair and Xolair order set faxed to [address] [place] [address] at [phone]. Fax confirmation received noting successful transmission.

## 2019-09-30 NOTE — TELEPHONE ENCOUNTER
Pt contacted, pt informed that Summary of  Benefits was received, the Summary of Benefits and physician orders was faxed to the [facility] [address]. Pt informed that [facility] should be contacting him by the end of this week.  Pt informed to call

## 2019-10-15 ENCOUNTER — OFFICE VISIT (OUTPATIENT)
Dept: ALLERGY | Facility: CLINIC | Age: 69
End: 2019-10-15
Payer: MEDICARE

## 2019-10-15 VITALS
SYSTOLIC BLOOD PRESSURE: 147 MMHG | OXYGEN SATURATION: 98 % | DIASTOLIC BLOOD PRESSURE: 90 MMHG | HEART RATE: 95 BPM | TEMPERATURE: 99 F | RESPIRATION RATE: 18 BRPM

## 2019-10-15 DIAGNOSIS — T78.3XXD ANGIOEDEMA, SUBSEQUENT ENCOUNTER: ICD-10-CM

## 2019-10-15 DIAGNOSIS — L50.1 CHRONIC IDIOPATHIC URTICARIA: Primary | ICD-10-CM

## 2019-10-15 PROCEDURE — 99214 OFFICE O/P EST MOD 30 MIN: CPT | Performed by: ALLERGY & IMMUNOLOGY

## 2019-10-15 PROCEDURE — G0463 HOSPITAL OUTPT CLINIC VISIT: HCPCS | Performed by: ALLERGY & IMMUNOLOGY

## 2019-10-15 RX ORDER — PREDNISONE 10 MG/1
10 TABLET ORAL DAILY
Qty: 30 TABLET | Refills: 0 | Status: SHIPPED | OUTPATIENT
Start: 2019-10-15 | End: 2021-03-11 | Stop reason: ALTCHOICE

## 2019-10-15 RX ORDER — BIOTIN 1 MG
1 TABLET ORAL DAILY
COMMUNITY

## 2019-10-15 NOTE — PROGRESS NOTES
Bronwyn Jaramillo is a 78year old male. HPI:   Patient presents with: Follow - Up: Patient reports that itching is still present but its not as bad as it previously was.      Patient is a 79-year-old male who presents for follow-up with a chief comp Other       Social History: Social History    Tobacco Use      Smoking status: Never Smoker      Smokeless tobacco: Never Used    Alcohol use:  Yes      Alcohol/week: 3.0 - 4.0 standard drinks      Types: 3 - 4 Standard drinks or equivalent per week      Fr Acid-Vitamin C (HYALURONIC ACID OR), Take by mouth., Disp: , Rfl:   Multiple Vitamin (MULTI-VITAMIN) Oral Tab, Take 1 tablet by mouth daily. , Disp: , Rfl:   Glucosamine-Chondroitin (GLUCOSAMINE CHONDR COMPLEX OR), Take by mouth., Disp: , Rfl:   ASTAXANTHIN noted  Head/Face: NC/Atraumatic  Eyes/Vision: conjunctiva and lids are normal extraocular motion is intact   Ears/Audiometry: tympanic membranes are normal bilaterally hearing is grossly intact  Nose/Mouth/Throat: nose and throat are clear mucous membranes these medications. Teaching, instruction and sample was provided to the patient by myself. Teaching included  a review of potential adverse side effects as well as potential efficacy.   Patient's questions were answered in regards to medication administra

## 2019-10-15 NOTE — PATIENT INSTRUCTIONS
Recs:  Continue with Benadryl every 4-6 hours as needed. Patient denies sedation with Benadryl. May consider Xyzal 5 mg once a day up to 4 times per day as a nonsedating antihistamine if refractory  Continue with prednisone 10 mg once a day.   Hopeful to

## 2019-10-15 NOTE — TELEPHONE ENCOUNTER
Pt presented for Allergy physician visit. Pt reports that he has not yet been contacted by infusion center to start Xolair injections. Infusion center contacted, spoke with Marietta Lopez at [facility].      Marietta Lopez states that she has sent a message to Miguel Beck

## 2019-10-24 ENCOUNTER — OFFICE VISIT (OUTPATIENT)
Dept: HEMATOLOGY/ONCOLOGY | Facility: HOSPITAL | Age: 69
End: 2019-10-24
Attending: ALLERGY & IMMUNOLOGY
Payer: MEDICARE

## 2019-10-24 VITALS
HEART RATE: 85 BPM | RESPIRATION RATE: 18 BRPM | SYSTOLIC BLOOD PRESSURE: 132 MMHG | TEMPERATURE: 98 F | DIASTOLIC BLOOD PRESSURE: 77 MMHG | OXYGEN SATURATION: 100 %

## 2019-10-24 DIAGNOSIS — L50.1 CHRONIC IDIOPATHIC URTICARIA: Primary | ICD-10-CM

## 2019-10-24 PROCEDURE — 96372 THER/PROPH/DIAG INJ SC/IM: CPT

## 2019-10-24 NOTE — PROGRESS NOTES
Pt arrived independently to infusion for 1st dose Xolair SQ injection ([id number]) accompanied by family. Xolair prescribed to manage urticaria of unknown etiology. Concern for environmental trigger as symptoms have improved with cooler weather. VS stable.  Pt

## 2019-11-21 ENCOUNTER — OFFICE VISIT (OUTPATIENT)
Dept: HEMATOLOGY/ONCOLOGY | Facility: HOSPITAL | Age: 69
End: 2019-11-21
Attending: ALLERGY & IMMUNOLOGY
Payer: MEDICARE

## 2019-11-21 VITALS
TEMPERATURE: 98 F | RESPIRATION RATE: 18 BRPM | OXYGEN SATURATION: 99 % | HEART RATE: 96 BPM | SYSTOLIC BLOOD PRESSURE: 130 MMHG | DIASTOLIC BLOOD PRESSURE: 72 MMHG

## 2019-11-21 DIAGNOSIS — L50.1 CHRONIC IDIOPATHIC URTICARIA: Primary | ICD-10-CM

## 2019-11-21 PROCEDURE — 96372 THER/PROPH/DIAG INJ SC/IM: CPT

## 2019-11-21 NOTE — PROGRESS NOTES
Pt arrived independently to infusion for  Xolair SQ injection ([id number]) accompanied by family. Xolair prescribed to manage urticaria of unknown etiology. VS stable.  Patient states he is feeling well and that he has only had to use his rescue inhaler twice s

## 2019-12-19 ENCOUNTER — OFFICE VISIT (OUTPATIENT)
Dept: HEMATOLOGY/ONCOLOGY | Facility: HOSPITAL | Age: 69
End: 2019-12-19
Attending: ALLERGY & IMMUNOLOGY
Payer: MEDICARE

## 2019-12-19 VITALS
OXYGEN SATURATION: 99 % | RESPIRATION RATE: 16 BRPM | DIASTOLIC BLOOD PRESSURE: 82 MMHG | HEART RATE: 81 BPM | SYSTOLIC BLOOD PRESSURE: 143 MMHG | TEMPERATURE: 98 F

## 2019-12-19 DIAGNOSIS — L50.1 CHRONIC IDIOPATHIC URTICARIA: Primary | ICD-10-CM

## 2019-12-19 PROCEDURE — 96372 THER/PROPH/DIAG INJ SC/IM: CPT

## 2019-12-19 NOTE — PROGRESS NOTES
Pt arrived independently to infusion for Xolair SQ injection ([id number]) accompanied by family. Xolair prescribed to manage urticaria of unknown etiology. VS stable. Patient states he is feeling well and symptoms are markedly improved.  Administered Xolair to

## 2020-01-07 ENCOUNTER — OFFICE VISIT (OUTPATIENT)
Dept: ALLERGY | Facility: CLINIC | Age: 70
End: 2020-01-07
Payer: MEDICARE

## 2020-01-07 VITALS
SYSTOLIC BLOOD PRESSURE: 133 MMHG | TEMPERATURE: 98 F | WEIGHT: 224 LBS | HEART RATE: 73 BPM | DIASTOLIC BLOOD PRESSURE: 87 MMHG | HEIGHT: 67 IN | BODY MASS INDEX: 35.16 KG/M2

## 2020-01-07 DIAGNOSIS — L50.1 CHRONIC IDIOPATHIC URTICARIA: Primary | ICD-10-CM

## 2020-01-07 DIAGNOSIS — T78.3XXD ANGIOEDEMA, SUBSEQUENT ENCOUNTER: ICD-10-CM

## 2020-01-07 PROCEDURE — 99214 OFFICE O/P EST MOD 30 MIN: CPT | Performed by: ALLERGY & IMMUNOLOGY

## 2020-01-07 PROCEDURE — G0463 HOSPITAL OUTPT CLINIC VISIT: HCPCS | Performed by: ALLERGY & IMMUNOLOGY

## 2020-01-07 NOTE — PROGRESS NOTES
Bronwyn Jaramillo is a 78year old male. HPI:   Patient presents with: Follow - Up: Pt would like to know if he can getting shots    Patient is a 69-year-old male who presents for follow-up with a chief complaint of hives and angioedema.     Patient times a week      Comment: 2 times month    Drug use: No       Medications (Active prior to today's visit):  Current Outpatient Medications   Medication Sig Dispense Refill   • Cholecalciferol (VITAMIN D3) 1000 units Oral Cap Take 1 tablet by mouth daily. • Coenzyme Q10 (COQ10) 100 MG Oral Cap Take by mouth every 30 (thirty) days. • predniSONE 10 MG Oral Tab Take 1 tablet (10 mg total) by mouth daily.  (Patient not taking: Reported on 1/7/2020 ) 30 tablet 0   • predniSONE 10 MG Oral Tab Take 1 tablet bilaterally hearing is grossly intact  Nose/Mouth/Throat: nose and throat are clear mucous membranes are moist   Neck/Thyroid: neck is supple without adenopathy  Lymphatic: no abnormal cervical, supraclavicular or axillary adenopathy is noted  Respiratory: included  a review of potential adverse side effects as well as potential efficacy. Patient's questions were answered in regards to medication administration and dosing and potential side effects.  Teaching was provided via the teach back method

## 2020-01-07 NOTE — PATIENT INSTRUCTIONS
Recs:   Continue with Xolair 300 mg every 4 weeks  Continue with Xyzal 5 mg once a night at bedtime up to 4 times per day if needed  Reviewed potential decrease in Xolair to 150 every 4 weeks in 3 months if patient remains well controlled  Follow-up in 3 m

## 2020-01-16 ENCOUNTER — OFFICE VISIT (OUTPATIENT)
Dept: HEMATOLOGY/ONCOLOGY | Facility: HOSPITAL | Age: 70
End: 2020-01-16
Attending: ALLERGY & IMMUNOLOGY
Payer: MEDICARE

## 2020-01-16 VITALS
RESPIRATION RATE: 16 BRPM | SYSTOLIC BLOOD PRESSURE: 126 MMHG | DIASTOLIC BLOOD PRESSURE: 64 MMHG | OXYGEN SATURATION: 99 % | TEMPERATURE: 98 F | HEART RATE: 77 BPM

## 2020-01-16 DIAGNOSIS — L50.1 CHRONIC IDIOPATHIC URTICARIA: Primary | ICD-10-CM

## 2020-01-16 PROCEDURE — 96372 THER/PROPH/DIAG INJ SC/IM: CPT

## 2020-01-16 NOTE — PROGRESS NOTES
Pt arrived to infusion for Xolair SQ injection ([id number]) accompanied by family. Xolair prescribed to manage urticaria of unknown etiology. VS stable. Patient states he is feeling well and symptoms are stable. Pt reports, \"I can tell when it's wearing off.

## 2020-02-20 ENCOUNTER — APPOINTMENT (OUTPATIENT)
Dept: HEMATOLOGY/ONCOLOGY | Facility: HOSPITAL | Age: 70
End: 2020-02-20
Attending: ALLERGY & IMMUNOLOGY
Payer: MEDICARE

## 2020-02-27 ENCOUNTER — OFFICE VISIT (OUTPATIENT)
Dept: HEMATOLOGY/ONCOLOGY | Facility: HOSPITAL | Age: 70
End: 2020-02-27
Attending: ALLERGY & IMMUNOLOGY
Payer: MEDICARE

## 2020-02-27 VITALS
DIASTOLIC BLOOD PRESSURE: 70 MMHG | SYSTOLIC BLOOD PRESSURE: 121 MMHG | TEMPERATURE: 98 F | RESPIRATION RATE: 16 BRPM | HEART RATE: 90 BPM | OXYGEN SATURATION: 99 %

## 2020-02-27 DIAGNOSIS — L50.1 CHRONIC IDIOPATHIC URTICARIA: Primary | ICD-10-CM

## 2020-02-27 PROCEDURE — 96372 THER/PROPH/DIAG INJ SC/IM: CPT

## 2020-03-12 ENCOUNTER — APPOINTMENT (OUTPATIENT)
Dept: HEMATOLOGY/ONCOLOGY | Facility: HOSPITAL | Age: 70
End: 2020-03-12
Attending: ALLERGY & IMMUNOLOGY
Payer: MEDICARE

## 2020-03-19 ENCOUNTER — APPOINTMENT (OUTPATIENT)
Dept: HEMATOLOGY/ONCOLOGY | Facility: HOSPITAL | Age: 70
End: 2020-03-19
Attending: ALLERGY & IMMUNOLOGY
Payer: MEDICARE

## 2020-03-25 ENCOUNTER — TELEPHONE (OUTPATIENT)
Dept: ALLERGY | Facility: CLINIC | Age: 70
End: 2020-03-25

## 2020-03-25 NOTE — TELEPHONE ENCOUNTER
Call reviewed and noted. If patient is doing well with Xolair I would recommend to continue at this time.   Should patient stop Xolair we would be more reliant on using antihistamines including Xyzal 5 mg a day up to 4 times per day if needed

## 2020-03-25 NOTE — TELEPHONE ENCOUNTER
Pt has a Xolair injections scheduled at the infusion center. Pt is 78years old. .   Had nasal septal surgery in February. The stent in nose was \"agony\". He just wants to stay well. Spoke to VICKIE at the injection center. Screen temp at door.  No v

## 2020-03-26 ENCOUNTER — OFFICE VISIT (OUTPATIENT)
Dept: HEMATOLOGY/ONCOLOGY | Facility: HOSPITAL | Age: 70
End: 2020-03-26
Attending: ALLERGY & IMMUNOLOGY
Payer: MEDICARE

## 2020-03-26 VITALS
HEART RATE: 72 BPM | DIASTOLIC BLOOD PRESSURE: 79 MMHG | RESPIRATION RATE: 18 BRPM | SYSTOLIC BLOOD PRESSURE: 142 MMHG | OXYGEN SATURATION: 100 % | TEMPERATURE: 99 F

## 2020-03-26 DIAGNOSIS — L50.1 CHRONIC IDIOPATHIC URTICARIA: Primary | ICD-10-CM

## 2020-03-26 PROCEDURE — 96372 THER/PROPH/DIAG INJ SC/IM: CPT

## 2020-03-26 NOTE — TELEPHONE ENCOUNTER
Patient is at infusion clinic and requesting Dr. Lourdes Taveras to reorder medication.     Eric Found)    Routed call to Allergy RN

## 2020-03-26 NOTE — TELEPHONE ENCOUNTER
Xolair Rx and Xolair Order Set signed by Dr. Zoë Wallace. Above faxed along with copy of insurance benefit investigation results showing no PA or Predetermination needed for Xolair to [facility] at [phone].     Fax confirmation receiving, noting winkler

## 2020-03-26 NOTE — TELEPHONE ENCOUNTER
See below. Infusion center is requesting new order for Xolair. Dr. Magdi Saul, pt last seen in Allergy 1/7/2020 for .  . .      Chronic idiopathic urticaria  (primary encounter diagnosis)  Angioedema, subsequent encounter    Xolair Rx pended, please print a

## 2020-03-26 NOTE — TELEPHONE ENCOUNTER
Spoke to [facility], RN at infusion center. New orders for Xolair are needed after today. Next dose would be on 4/23/2020. Predetermination will need to be done. Media tab look at orders from last time.        Routed to Dr. Zoë Wallace and Annette Rose, RN

## 2020-03-26 NOTE — PROGRESS NOTES
Pt arrived to infusion for Xolair SQ injection ([id number]). Xolair prescribed to manage urticaria of unknown etiology. Patient states his injections are working well. Feels his itching is 94% better then when he started.   He states he can tell when it is rafael

## 2020-04-02 ENCOUNTER — TELEPHONE (OUTPATIENT)
Dept: ALLERGY | Facility: CLINIC | Age: 70
End: 2020-04-02

## 2020-04-02 NOTE — TELEPHONE ENCOUNTER
Spoke with patient, due to COVID-19, we are advising to convert appointment to a telephone visit. Will need travel screen if keeping visit. Appointment is presently scheduled for 4/7/20 at 1330.         Advised we will call as close to the scheduled ti

## 2020-04-06 ENCOUNTER — TELEPHONE (OUTPATIENT)
Dept: ALLERGY | Facility: CLINIC | Age: 70
End: 2020-04-06

## 2020-04-07 ENCOUNTER — VIRTUAL PHONE E/M (OUTPATIENT)
Dept: ALLERGY | Facility: CLINIC | Age: 70
End: 2020-04-07
Payer: MEDICARE

## 2020-04-07 DIAGNOSIS — L50.1 CHRONIC IDIOPATHIC URTICARIA: Primary | ICD-10-CM

## 2020-04-07 DIAGNOSIS — T78.3XXD ANGIOEDEMA, SUBSEQUENT ENCOUNTER: ICD-10-CM

## 2020-04-07 PROCEDURE — 99441 PHONE E/M BY PHYS 5-10 MIN: CPT | Performed by: ALLERGY & IMMUNOLOGY

## 2020-04-07 NOTE — PROGRESS NOTES
Virtual/Telephone Check-In    [address] [place] [address] verbally {consents to/declines (Can be done by front staff):[id number]} a Virtual/Telephone Check-In service on 04/07/20.   Patient understands and accepts financial responsibility for any deductible, co-insuranc

## 2020-04-07 NOTE — PROGRESS NOTES
Virtual/Telephone Check-In    Patient understands and accepts financial responsibility for any deductible, co-insurance and/or co-pays associated with this service.   Pt consents to telephone visit as video visit was not functioning as I could not here clarissa

## 2020-04-23 ENCOUNTER — OFFICE VISIT (OUTPATIENT)
Dept: HEMATOLOGY/ONCOLOGY | Facility: HOSPITAL | Age: 70
End: 2020-04-23
Attending: ALLERGY & IMMUNOLOGY
Payer: MEDICARE

## 2020-04-23 VITALS
RESPIRATION RATE: 20 BRPM | HEART RATE: 72 BPM | DIASTOLIC BLOOD PRESSURE: 83 MMHG | TEMPERATURE: 97 F | SYSTOLIC BLOOD PRESSURE: 124 MMHG

## 2020-04-23 DIAGNOSIS — L50.1 CHRONIC IDIOPATHIC URTICARIA: Primary | ICD-10-CM

## 2020-04-23 PROCEDURE — 96372 THER/PROPH/DIAG INJ SC/IM: CPT

## 2020-04-23 NOTE — PROGRESS NOTES
Pt arrived to infusion for Xolair SQ injection ([id number]). Xolair prescribed to manage urticaria of unknown etiology. Patient states his injections are working well. He did have a panic attack when he arrived to the infusion area.  He feels like wearing a m

## 2020-05-21 ENCOUNTER — APPOINTMENT (OUTPATIENT)
Dept: HEMATOLOGY/ONCOLOGY | Facility: HOSPITAL | Age: 70
End: 2020-05-21
Attending: ALLERGY & IMMUNOLOGY
Payer: MEDICARE

## 2020-05-29 ENCOUNTER — APPOINTMENT (OUTPATIENT)
Dept: HEMATOLOGY/ONCOLOGY | Facility: HOSPITAL | Age: 70
End: 2020-05-29
Attending: ALLERGY & IMMUNOLOGY
Payer: MEDICARE

## 2020-06-05 ENCOUNTER — OFFICE VISIT (OUTPATIENT)
Dept: HEMATOLOGY/ONCOLOGY | Facility: HOSPITAL | Age: 70
End: 2020-06-05
Attending: ALLERGY & IMMUNOLOGY
Payer: MEDICARE

## 2020-06-05 VITALS
SYSTOLIC BLOOD PRESSURE: 134 MMHG | DIASTOLIC BLOOD PRESSURE: 73 MMHG | TEMPERATURE: 99 F | OXYGEN SATURATION: 100 % | RESPIRATION RATE: 18 BRPM | HEART RATE: 78 BPM

## 2020-06-05 DIAGNOSIS — L50.1 CHRONIC IDIOPATHIC URTICARIA: Primary | ICD-10-CM

## 2020-06-05 PROCEDURE — 96372 THER/PROPH/DIAG INJ SC/IM: CPT

## 2020-06-05 NOTE — PROGRESS NOTES
Pt arrived to infusion for Xolair SQ injection ([id number]). Xolair prescribed to manage urticaria of unknown etiology. Patient states his injections are working well. Administered Xolair total dose 300 mg SQ (R) lower abd injections at least 1 in apart.  Pt

## 2020-07-02 ENCOUNTER — OFFICE VISIT (OUTPATIENT)
Dept: HEMATOLOGY/ONCOLOGY | Facility: HOSPITAL | Age: 70
End: 2020-07-02
Attending: ALLERGY & IMMUNOLOGY
Payer: MEDICARE

## 2020-07-02 VITALS
RESPIRATION RATE: 18 BRPM | OXYGEN SATURATION: 98 % | TEMPERATURE: 98 F | DIASTOLIC BLOOD PRESSURE: 80 MMHG | SYSTOLIC BLOOD PRESSURE: 144 MMHG | HEART RATE: 61 BPM

## 2020-07-02 DIAGNOSIS — L50.1 CHRONIC IDIOPATHIC URTICARIA: Primary | ICD-10-CM

## 2020-07-02 PROCEDURE — 96372 THER/PROPH/DIAG INJ SC/IM: CPT

## 2020-07-02 NOTE — PROGRESS NOTES
Pt arrived to infusion for Xolair SQ injection ([id number]). Xolair prescribed to manage urticaria of unknown etiology. Patient states his injections are working well. Administered Xolair total dose 300 mg SQ (R) lower abd injections at least 1 in apart.  Pt

## 2020-07-18 ENCOUNTER — TELEPHONE (OUTPATIENT)
Dept: ALLERGY | Facility: CLINIC | Age: 70
End: 2020-07-18

## 2020-07-31 ENCOUNTER — OFFICE VISIT (OUTPATIENT)
Dept: HEMATOLOGY/ONCOLOGY | Facility: HOSPITAL | Age: 70
End: 2020-07-31
Attending: ALLERGY & IMMUNOLOGY
Payer: MEDICARE

## 2020-07-31 VITALS
TEMPERATURE: 98 F | HEART RATE: 66 BPM | DIASTOLIC BLOOD PRESSURE: 73 MMHG | SYSTOLIC BLOOD PRESSURE: 121 MMHG | RESPIRATION RATE: 16 BRPM

## 2020-07-31 DIAGNOSIS — L50.1 CHRONIC IDIOPATHIC URTICARIA: Primary | ICD-10-CM

## 2020-07-31 PROCEDURE — 96372 THER/PROPH/DIAG INJ SC/IM: CPT

## 2020-07-31 NOTE — PROGRESS NOTES
Pt arrived to infusion for Xolair SQ injection ([id number]). Xolair prescribed to manage urticaria of unknown etiology. Patient states his injections are working well. Administered Xolair total dose 300 mg SQ Left lower abdomen.  Pt tolerated without inciden

## 2020-08-06 ENCOUNTER — TELEMEDICINE (OUTPATIENT)
Dept: ALLERGY | Facility: CLINIC | Age: 70
End: 2020-08-06

## 2020-08-06 DIAGNOSIS — T78.3XXD ANGIOEDEMA, SUBSEQUENT ENCOUNTER: ICD-10-CM

## 2020-08-06 DIAGNOSIS — L50.1 CHRONIC IDIOPATHIC URTICARIA: Primary | ICD-10-CM

## 2020-08-06 PROCEDURE — 99214 OFFICE O/P EST MOD 30 MIN: CPT | Performed by: ALLERGY & IMMUNOLOGY

## 2020-08-06 NOTE — PATIENT INSTRUCTIONS
Recs:   We will attempt to decrease Xolair 250 mg every 4 weeks from his current 300 mg every 4 weeks given his sustained control  Continue with Xyzal once a day up to 4 times per day if needed  If Xyzal is too sedating then may consider Allegra, fexofenad

## 2020-08-06 NOTE — PROGRESS NOTES
Sang You is a 69year old male. HPI:   No chief complaint on file. Patient is a 41-year-old male who presents for follow-up via video visit due to current coronavirus pandemic.     This visit is conducted using Telemedicine with live, inte Kidney stones    • Other and unspecified hyperlipidemia    • Panic attacks    • Tinnitus     10 years    • Unspecified essential hypertension       Past Surgical History:   Procedure Laterality Date   • ANESTH,SURGERY OF SHOULDER      right shoulder   • CA 1/7/2020 ) 30 tablet 0   • diphenhydrAMINE HCl 25 MG Oral Tab Take 75 mg by mouth every 6 (six) hours as needed for Itching. • melatonin 5 MG Oral Cap Take 5 mg by mouth nightly.      • ibuprofen 600 MG Oral Tab Take 600 mg by mouth every 6 (six) hours Oral Cap Take by mouth every 30 (thirty) days.            Allergies:  No Known Allergies      ROS:   Allergic/Immuno:  See hpi  Cardiovascular:  Negative for irregular heartbeat/palpitations, chest pain, edema  Constitutional:  Negative night sweats,weight Xyzal is too sedating then may consider Allegra, fexofenadine 180 mg in its place    Patient requested potential names for PCPs.   Recommended internal medicine at the [facility] including  Dr. Dante Barrios and Dr. Concha Sifuentes

## 2020-08-27 ENCOUNTER — APPOINTMENT (OUTPATIENT)
Dept: HEMATOLOGY/ONCOLOGY | Facility: HOSPITAL | Age: 70
End: 2020-08-27
Attending: ALLERGY & IMMUNOLOGY
Payer: MEDICARE

## 2020-09-10 ENCOUNTER — OFFICE VISIT (OUTPATIENT)
Dept: HEMATOLOGY/ONCOLOGY | Facility: HOSPITAL | Age: 70
End: 2020-09-10
Attending: ALLERGY & IMMUNOLOGY
Payer: MEDICARE

## 2020-09-10 ENCOUNTER — TELEPHONE (OUTPATIENT)
Dept: ALLERGY | Facility: CLINIC | Age: 70
End: 2020-09-10

## 2020-09-10 VITALS
RESPIRATION RATE: 16 BRPM | OXYGEN SATURATION: 99 % | HEART RATE: 72 BPM | TEMPERATURE: 98 F | DIASTOLIC BLOOD PRESSURE: 74 MMHG | SYSTOLIC BLOOD PRESSURE: 137 MMHG

## 2020-09-10 DIAGNOSIS — L50.1 CHRONIC IDIOPATHIC URTICARIA: Primary | ICD-10-CM

## 2020-09-10 PROCEDURE — 96372 THER/PROPH/DIAG INJ SC/IM: CPT

## 2020-09-10 NOTE — TELEPHONE ENCOUNTER
Xolair 150 mg Prefilled syringe administer subcutaneously every 28 days order placed, please review, print and sign.      Please review and sign Xolair Order Set

## 2020-09-10 NOTE — PROGRESS NOTES
Pt arrived to infusion for Xolair SQ injection ([id number]). Xolair prescribed to manage urticaria of unknown etiology. Patient states his injections are working well. Administered Xolair total dose 300 mg SQ (R) lower abd injections at least 1 in apart.  Pt

## 2020-09-10 NOTE — TELEPHONE ENCOUNTER
Darryle Falter from [address] [place] [address] would like to speak with a nurse. Transferring to ALLERGY.

## 2020-09-10 NOTE — TELEPHONE ENCOUNTER
Michelle Infusion RN at [facility] calling in regarding patient's Xolair regimen:      Pt is currently there for infusion (which RN already gave him).   Original order was for 300mg Q28 days but patient mentioned that per last office visit on 8/06/202

## 2020-09-10 NOTE — TELEPHONE ENCOUNTER
Call reviewed and noted. There was an error in the previous note. it should say attempt to decrease Xolair 150 mg every 4 weeks. Please place order for Xolair 150 mg every 4 weeks and send over to the infusion center.   Okay to start Xolair 150 mg every

## 2020-09-10 NOTE — TELEPHONE ENCOUNTER
Xolair order as below and Xolair order set faxed to [facility] at [phone]. Fax confirmation received noting successful transmission.

## 2020-10-08 ENCOUNTER — APPOINTMENT (OUTPATIENT)
Dept: HEMATOLOGY/ONCOLOGY | Facility: HOSPITAL | Age: 70
End: 2020-10-08
Attending: ALLERGY & IMMUNOLOGY
Payer: MEDICARE

## 2020-10-22 ENCOUNTER — OFFICE VISIT (OUTPATIENT)
Dept: HEMATOLOGY/ONCOLOGY | Facility: HOSPITAL | Age: 70
End: 2020-10-22
Attending: ALLERGY & IMMUNOLOGY
Payer: MEDICARE

## 2020-10-22 VITALS
SYSTOLIC BLOOD PRESSURE: 143 MMHG | TEMPERATURE: 98 F | DIASTOLIC BLOOD PRESSURE: 68 MMHG | HEART RATE: 85 BPM | OXYGEN SATURATION: 97 % | RESPIRATION RATE: 16 BRPM

## 2020-10-22 DIAGNOSIS — L50.1 CHRONIC IDIOPATHIC URTICARIA: Primary | ICD-10-CM

## 2020-10-22 PROCEDURE — 96372 THER/PROPH/DIAG INJ SC/IM: CPT

## 2020-10-22 NOTE — PROGRESS NOTES
Pt arrived to infusion for Xolair SQ injection ([id number]). Xolair prescribed to manage urticaria of unknown etiology. Patient states he has just a little itching on forehead  Administered Xolair reduced dose started today.   Dose was 300mg, MD reduced dose t

## 2020-11-05 ENCOUNTER — APPOINTMENT (OUTPATIENT)
Dept: HEMATOLOGY/ONCOLOGY | Facility: HOSPITAL | Age: 70
End: 2020-11-05
Attending: ALLERGY & IMMUNOLOGY
Payer: MEDICARE

## 2020-11-19 ENCOUNTER — APPOINTMENT (OUTPATIENT)
Dept: HEMATOLOGY/ONCOLOGY | Facility: HOSPITAL | Age: 70
End: 2020-11-19
Attending: ALLERGY & IMMUNOLOGY
Payer: MEDICARE

## 2020-12-09 ENCOUNTER — LAB ENCOUNTER (OUTPATIENT)
Dept: LAB | Age: 70
End: 2020-12-09
Attending: INTERNAL MEDICINE
Payer: MEDICARE

## 2020-12-09 DIAGNOSIS — R51.9 HEADACHE: ICD-10-CM

## 2020-12-09 DIAGNOSIS — E78.5 HYPERLIPIDEMIA: Primary | ICD-10-CM

## 2020-12-09 DIAGNOSIS — F41.9 ANXIETY: ICD-10-CM

## 2020-12-09 DIAGNOSIS — F41.0 PANIC ATTACK: ICD-10-CM

## 2020-12-09 DIAGNOSIS — N40.0 HYPERTROPHY OF PROSTATE: ICD-10-CM

## 2020-12-09 DIAGNOSIS — K21.9 GERD (GASTROESOPHAGEAL REFLUX DISEASE): ICD-10-CM

## 2020-12-09 LAB
ALBUMIN SERPL-MCNC: 3.9 G/DL (ref 3.4–5)
ALBUMIN/GLOB SERPL: 1.3 {RATIO} (ref 1–2)
ALP LIVER SERPL-CCNC: 107 U/L
ALT SERPL-CCNC: 26 U/L
ANION GAP SERPL CALC-SCNC: 6 MMOL/L (ref 0–18)
AST SERPL-CCNC: 18 U/L (ref 15–37)
BASOPHILS # BLD AUTO: 0.06 X10(3) UL (ref 0–0.2)
BASOPHILS NFR BLD AUTO: 0.9 %
BILIRUB SERPL-MCNC: 0.7 MG/DL (ref 0.1–2)
BUN BLD-MCNC: 14 MG/DL (ref 7–18)
BUN/CREAT SERPL: 17.3 (ref 10–20)
CALCIUM BLD-MCNC: 10.1 MG/DL (ref 8.5–10.1)
CHLORIDE SERPL-SCNC: 112 MMOL/L (ref 98–112)
CHOLEST SMN-MCNC: 261 MG/DL (ref ?–200)
CO2 SERPL-SCNC: 26 MMOL/L (ref 21–32)
COMPLEXED PSA SERPL-MCNC: 3.17 NG/ML (ref ?–4)
CREAT BLD-MCNC: 0.81 MG/DL
DEPRECATED RDW RBC AUTO: 42.5 FL (ref 35.1–46.3)
EOSINOPHIL # BLD AUTO: 0.13 X10(3) UL (ref 0–0.7)
EOSINOPHIL NFR BLD AUTO: 1.9 %
ERYTHROCYTE [DISTWIDTH] IN BLOOD BY AUTOMATED COUNT: 14.4 % (ref 11–15)
GLOBULIN PLAS-MCNC: 2.9 G/DL (ref 2.8–4.4)
GLUCOSE BLD-MCNC: 93 MG/DL (ref 70–99)
HCT VFR BLD AUTO: 48.6 %
HDLC SERPL-MCNC: 78 MG/DL (ref 40–59)
HGB BLD-MCNC: 15.6 G/DL
IMM GRANULOCYTES # BLD AUTO: 0.02 X10(3) UL (ref 0–1)
IMM GRANULOCYTES NFR BLD: 0.3 %
LDLC SERPL CALC-MCNC: 165 MG/DL (ref ?–100)
LYMPHOCYTES # BLD AUTO: 2.19 X10(3) UL (ref 1–4)
LYMPHOCYTES NFR BLD AUTO: 31.5 %
M PROTEIN MFR SERPL ELPH: 6.8 G/DL (ref 6.4–8.2)
MCH RBC QN AUTO: 26.5 PG (ref 26–34)
MCHC RBC AUTO-ENTMCNC: 32.1 G/DL (ref 31–37)
MCV RBC AUTO: 82.7 FL
MONOCYTES # BLD AUTO: 0.49 X10(3) UL (ref 0.1–1)
MONOCYTES NFR BLD AUTO: 7.1 %
NEUTROPHILS # BLD AUTO: 4.06 X10 (3) UL (ref 1.5–7.7)
NEUTROPHILS # BLD AUTO: 4.06 X10(3) UL (ref 1.5–7.7)
NEUTROPHILS NFR BLD AUTO: 58.3 %
NONHDLC SERPL-MCNC: 183 MG/DL (ref ?–130)
OSMOLALITY SERPL CALC.SUM OF ELEC: 298 MOSM/KG (ref 275–295)
PATIENT FASTING Y/N/NP: YES
PATIENT FASTING Y/N/NP: YES
PLATELET # BLD AUTO: 161 10(3)UL (ref 150–450)
POTASSIUM SERPL-SCNC: 3.8 MMOL/L (ref 3.5–5.1)
RBC # BLD AUTO: 5.88 X10(6)UL
SODIUM SERPL-SCNC: 144 MMOL/L (ref 136–145)
T3FREE SERPL-MCNC: 2.72 PG/ML (ref 2.4–4.2)
T4 FREE SERPL-MCNC: 1.1 NG/DL (ref 0.8–1.7)
TRIGL SERPL-MCNC: 90 MG/DL (ref 30–149)
TSI SER-ACNC: 1.63 MIU/ML (ref 0.36–3.74)
VLDLC SERPL CALC-MCNC: 18 MG/DL (ref 0–30)
WBC # BLD AUTO: 7 X10(3) UL (ref 4–11)

## 2020-12-09 PROCEDURE — 80061 LIPID PANEL: CPT

## 2020-12-09 PROCEDURE — 84439 ASSAY OF FREE THYROXINE: CPT

## 2020-12-09 PROCEDURE — 85025 COMPLETE CBC W/AUTO DIFF WBC: CPT

## 2020-12-09 PROCEDURE — 84443 ASSAY THYROID STIM HORMONE: CPT

## 2020-12-09 PROCEDURE — 36415 COLL VENOUS BLD VENIPUNCTURE: CPT

## 2020-12-09 PROCEDURE — 84481 FREE ASSAY (FT-3): CPT

## 2020-12-09 PROCEDURE — 80053 COMPREHEN METABOLIC PANEL: CPT

## 2020-12-17 ENCOUNTER — OFFICE VISIT (OUTPATIENT)
Dept: HEMATOLOGY/ONCOLOGY | Facility: HOSPITAL | Age: 70
End: 2020-12-17
Attending: ALLERGY & IMMUNOLOGY
Payer: MEDICARE

## 2020-12-17 VITALS
HEART RATE: 76 BPM | RESPIRATION RATE: 16 BRPM | DIASTOLIC BLOOD PRESSURE: 78 MMHG | SYSTOLIC BLOOD PRESSURE: 134 MMHG | OXYGEN SATURATION: 99 % | TEMPERATURE: 98 F

## 2020-12-17 DIAGNOSIS — L50.1 CHRONIC IDIOPATHIC URTICARIA: Primary | ICD-10-CM

## 2020-12-17 PROCEDURE — 96372 THER/PROPH/DIAG INJ SC/IM: CPT

## 2020-12-17 NOTE — PROGRESS NOTES
Pt arrived to infusion for Xolair SQ injection (T6rhxoa). Xolair prescribed to manage urticaria of unknown etiology. Patient states he missed last dose due to COVID. Reports some urticaria returned since last dose due to missing dose.     Administered Frances Lopez

## 2021-01-14 ENCOUNTER — OFFICE VISIT (OUTPATIENT)
Dept: HEMATOLOGY/ONCOLOGY | Facility: HOSPITAL | Age: 71
End: 2021-01-14
Attending: ALLERGY & IMMUNOLOGY
Payer: MEDICARE

## 2021-01-14 VITALS
SYSTOLIC BLOOD PRESSURE: 161 MMHG | RESPIRATION RATE: 16 BRPM | HEART RATE: 80 BPM | DIASTOLIC BLOOD PRESSURE: 88 MMHG | OXYGEN SATURATION: 99 % | TEMPERATURE: 99 F

## 2021-01-14 DIAGNOSIS — L50.1 CHRONIC IDIOPATHIC URTICARIA: Primary | ICD-10-CM

## 2021-01-14 PROCEDURE — 96372 THER/PROPH/DIAG INJ SC/IM: CPT

## 2021-01-14 NOTE — PROGRESS NOTES
Pt arrived to infusion for Xolair 4 of 6 SQ injection ([id number]). Xolair prescribed to manage urticaria of unknown etiology. Patient states he's continuing to have urticaria. Pt reports symptoms are better since November, but not completely resolved.     Ad

## 2021-01-30 ENCOUNTER — HOSPITAL ENCOUNTER (EMERGENCY)
Facility: HOSPITAL | Age: 71
Discharge: HOME | End: 2021-01-30
Payer: MEDICARE

## 2021-01-30 ENCOUNTER — APPOINTMENT (OUTPATIENT)
Dept: GENERAL RADIOLOGY | Facility: HOSPITAL | Age: 71
End: 2021-01-30
Attending: NURSE PRACTITIONER
Payer: MEDICARE

## 2021-01-30 VITALS
TEMPERATURE: 98 F | OXYGEN SATURATION: 98 % | DIASTOLIC BLOOD PRESSURE: 88 MMHG | WEIGHT: 207 LBS | HEIGHT: 68 IN | HEART RATE: 78 BPM | SYSTOLIC BLOOD PRESSURE: 155 MMHG | RESPIRATION RATE: 16 BRPM | BODY MASS INDEX: 31.37 KG/M2

## 2021-01-30 DIAGNOSIS — S69.91XA INJURY OF FINGER OF RIGHT HAND, INITIAL ENCOUNTER: Primary | ICD-10-CM

## 2021-01-30 DIAGNOSIS — S61.320A LACERATION OF RIGHT INDEX FINGER WITH FOREIGN BODY AND DAMAGE TO NAIL, INITIAL ENCOUNTER: ICD-10-CM

## 2021-01-30 PROCEDURE — 12001 RPR S/N/AX/GEN/TRNK 2.5CM/<: CPT

## 2021-01-30 PROCEDURE — 73140 X-RAY EXAM OF FINGER(S): CPT | Performed by: NURSE PRACTITIONER

## 2021-01-30 PROCEDURE — 99283 EMERGENCY DEPT VISIT LOW MDM: CPT

## 2021-01-30 RX ORDER — LIDOCAINE HYDROCHLORIDE 10 MG/ML
INJECTION, SOLUTION EPIDURAL; INFILTRATION; INTRACAUDAL; PERINEURAL
Status: COMPLETED
Start: 2021-01-30 | End: 2021-01-30

## 2021-01-30 RX ORDER — LIDOCAINE HYDROCHLORIDE 10 MG/ML
20 INJECTION, SOLUTION EPIDURAL; INFILTRATION; INTRACAUDAL; PERINEURAL ONCE
Status: COMPLETED | OUTPATIENT
Start: 2021-01-30 | End: 2021-01-30

## 2021-01-30 RX ORDER — HYDROCODONE BITARTRATE AND ACETAMINOPHEN 5; 325 MG/1; MG/1
1 TABLET ORAL ONCE
Status: COMPLETED | OUTPATIENT
Start: 2021-01-30 | End: 2021-01-30

## 2021-01-30 RX ORDER — CEPHALEXIN 500 MG/1
500 CAPSULE ORAL EVERY 12 HOURS
Qty: 14 CAPSULE | Refills: 0 | Status: SHIPPED | OUTPATIENT
Start: 2021-01-30 | End: 2021-02-06

## 2021-01-30 NOTE — ED INITIAL ASSESSMENT (HPI)
Pt presents with lac to rt 2nd digit from a power tool which occured about 30min pta. Bleeding is active, but slow at this time. Unknown last tdap.

## 2021-01-31 NOTE — ED PROVIDER NOTES
Patient Seen in: [facility] AND [facility] Emergency Department      History   Patient presents with:  Laceration/Abrasion    Stated Complaint: finger lac    HPI/Subjective:   HPI    41-year-old male presents the emergency department for evaluation.   Patient sta Current:/88   Pulse 78   Temp 98 °F (36.7 °C) (Oral)   Resp 16   Ht 172.7 cm (5' 8\")   Wt 93.9 kg   SpO2 98%   BMI 31.47 kg/m²         Physical Exam  Vitals signs reviewed. Constitutional:       Appearance: Normal appearance.    HENT:      Head: No CONCLUSION:  1. No acute appearing fracture or dislocation. Degenerative change of the DIP and PIP joint.  2. Soft tissue irregularity/laceration at the palmar medial aspect of the right 2nd finger with a small 1-2 mm radiopaque foreign body in the soft ti

## 2021-01-31 NOTE — ED NOTES
Patient endorsed to me per primary RN, José Miguel Choi. Patient right 2nd digit has been sutured and wrapped with surgicel to stop the bleed. Will reassess before before discharge.

## 2021-02-06 ENCOUNTER — HOSPITAL ENCOUNTER (OUTPATIENT)
Age: 71
Discharge: ANOTHER HOSPITAL | End: 2021-02-06
Payer: MEDICARE

## 2021-02-06 ENCOUNTER — APPOINTMENT (OUTPATIENT)
Dept: CT IMAGING | Facility: HOSPITAL | Age: 71
End: 2021-02-06
Attending: EMERGENCY MEDICINE
Payer: MEDICARE

## 2021-02-06 ENCOUNTER — HOSPITAL ENCOUNTER (EMERGENCY)
Facility: HOSPITAL | Age: 71
Discharge: HOME | End: 2021-02-06
Attending: EMERGENCY MEDICINE
Payer: MEDICARE

## 2021-02-06 VITALS
SYSTOLIC BLOOD PRESSURE: 147 MMHG | TEMPERATURE: 98 F | HEIGHT: 68 IN | RESPIRATION RATE: 20 BRPM | WEIGHT: 214 LBS | BODY MASS INDEX: 32.43 KG/M2 | OXYGEN SATURATION: 99 % | HEART RATE: 71 BPM | DIASTOLIC BLOOD PRESSURE: 87 MMHG

## 2021-02-06 VITALS
SYSTOLIC BLOOD PRESSURE: 142 MMHG | RESPIRATION RATE: 20 BRPM | DIASTOLIC BLOOD PRESSURE: 69 MMHG | HEART RATE: 73 BPM | OXYGEN SATURATION: 97 % | TEMPERATURE: 98 F

## 2021-02-06 DIAGNOSIS — R31.9 HEMATURIA, UNSPECIFIED TYPE: ICD-10-CM

## 2021-02-06 DIAGNOSIS — N30.00 ACUTE CYSTITIS WITHOUT HEMATURIA: ICD-10-CM

## 2021-02-06 DIAGNOSIS — N20.0 STAGHORN CALCULUS: Primary | ICD-10-CM

## 2021-02-06 DIAGNOSIS — M54.9 BACK PAIN WITHOUT RADIATION: Primary | ICD-10-CM

## 2021-02-06 DIAGNOSIS — M54.50 ACUTE LEFT-SIDED LOW BACK PAIN WITHOUT SCIATICA: ICD-10-CM

## 2021-02-06 LAB
BACTERIA UR QL AUTO: NEGATIVE /HPF
BILIRUB UR QL STRIP: NEGATIVE
BILIRUB UR QL: NEGATIVE
CLARITY UR: CLEAR
CLARITY UR: CLEAR
COLOR UR: YELLOW
COLOR UR: YELLOW
GLUCOSE UR STRIP-MCNC: NEGATIVE MG/DL
GLUCOSE UR-MCNC: NEGATIVE MG/DL
KETONES UR STRIP-MCNC: NEGATIVE MG/DL
NITRITE UR QL STRIP.AUTO: NEGATIVE
NITRITE UR QL STRIP: NEGATIVE
PH UR STRIP: 6.5 [PH]
PH UR: 6 [PH] (ref 5–8)
PROT UR-MCNC: NEGATIVE MG/DL
RBC #/AREA URNS AUTO: 5 /HPF
SP GR UR STRIP: 1.02
SP GR UR STRIP: 1.02 (ref 1–1.03)
UROBILINOGEN UR STRIP-ACNC: <2
UROBILINOGEN UR STRIP-ACNC: <2 MG/DL
WBC #/AREA URNS AUTO: 16 /HPF

## 2021-02-06 PROCEDURE — 81001 URINALYSIS AUTO W/SCOPE: CPT | Performed by: EMERGENCY MEDICINE

## 2021-02-06 PROCEDURE — 87086 URINE CULTURE/COLONY COUNT: CPT | Performed by: EMERGENCY MEDICINE

## 2021-02-06 PROCEDURE — 81002 URINALYSIS NONAUTO W/O SCOPE: CPT | Performed by: NURSE PRACTITIONER

## 2021-02-06 PROCEDURE — 99215 OFFICE O/P EST HI 40 MIN: CPT | Performed by: NURSE PRACTITIONER

## 2021-02-06 PROCEDURE — 74176 CT ABD & PELVIS W/O CONTRAST: CPT | Performed by: EMERGENCY MEDICINE

## 2021-02-06 PROCEDURE — 99284 EMERGENCY DEPT VISIT MOD MDM: CPT

## 2021-02-06 RX ORDER — CEPHALEXIN 500 MG/1
500 CAPSULE ORAL 2 TIMES DAILY
Qty: 20 CAPSULE | Refills: 0 | Status: SHIPPED | OUTPATIENT
Start: 2021-02-06 | End: 2021-02-16

## 2021-02-06 RX ORDER — HYDROCODONE BITARTRATE AND ACETAMINOPHEN 5; 325 MG/1; MG/1
1-2 TABLET ORAL EVERY 6 HOURS PRN
Qty: 20 TABLET | Refills: 0 | Status: SHIPPED | OUTPATIENT
Start: 2021-02-06

## 2021-02-06 RX ORDER — HYDROCODONE BITARTRATE AND ACETAMINOPHEN 5; 325 MG/1; MG/1
2 TABLET ORAL ONCE
Status: COMPLETED | OUTPATIENT
Start: 2021-02-06 | End: 2021-02-06

## 2021-02-06 NOTE — ED INITIAL ASSESSMENT (HPI)
Intense severe constant low back pain since Thursday can get comfortable only if supine. BP has been 220/110 never called pcp. stts hx of stones but feels different. Had recent laceration to rt index finger but that is ok per pt.

## 2021-02-06 NOTE — ED INITIAL ASSESSMENT (HPI)
Patient presents to ER with c/o left lower back pain since Thursday. Denies recent trauma or injuries.  Sent from [facility] for further eval

## 2021-02-06 NOTE — ED PROVIDER NOTES
Patient Seen in: [facility]    History   CC: back pain  HPI: Maday Yancey Sr. 69year old male  who presents c/o left sided lower back pain which is intermittent in nature x2 days w/o known injury/trauma.  States he did slip on wet floor 2 w signs reviewed. All other systems reviewed and negative except as noted above. PSFH elements reviewed from today and agreed except as otherwise stated in HPI.     Medications :   cephALEXin 500 MG Oral Cap,  Take 1 capsule (500 mg total) by mouth Q1 tablet (10 mg total) by mouth daily. Patient not taking: Reported on 1/7/2020    predniSONE 10 MG Oral Tab,  Take 1 tablet (10 mg total) by mouth daily.   Patient not taking: Reported on 1/7/2020    Albuterol Sulfate HFA (PROAIR HFA) 108 (90 Base) MCG/ACT expansion bilaterally   CV - RRR  GI - Appears round and flat, BS +x4 quadrants, no tenderness/guarding with palpation   - no CVA tenderness bilat  Skin - no rashes or petechiae noted, pink warm and dry throughout, mmm, no obvious signs of swelling/traum

## 2021-02-07 NOTE — ED PROVIDER NOTES
Patient Seen in: [facility] AND [facility] Emergency Department    History   Patient presents with:  Back Pain      HPI    The patient presents to the ER complaining of left lower back pain starting 3 days ago. He states there is no known injury.   Pain started Exercise: No    Social History Narrative      The patient does  use an assistive device. . Right wrist splint       The patient does live in a home with stairs.       ROS  Pertinent Positives: Left lower back pain  All other organ systems are reviewed and affect. His behavior is normal.   Nursing note and vitals reviewed.       ED Course        Labs Reviewed   URINALYSIS WITH CULTURE REFLEX - Abnormal; Notable for the following components:       Result Value    Ketones Urine Trace (*)     Blood Urine Small ( strain    Medical Record Review: I personally reviewed available prior medical records for any recent pertinent discharge summaries, testing, and procedures and reviewed those reports. Complicating Factors:  The patient already has does not have any pert

## 2021-02-11 ENCOUNTER — APPOINTMENT (OUTPATIENT)
Dept: HEMATOLOGY/ONCOLOGY | Facility: HOSPITAL | Age: 71
End: 2021-02-11
Attending: ALLERGY & IMMUNOLOGY
Payer: MEDICARE

## 2021-02-15 ENCOUNTER — HOSPITAL ENCOUNTER (OUTPATIENT)
Dept: GENERAL RADIOLOGY | Age: 71
Discharge: HOME | End: 2021-02-15

## 2021-02-15 DIAGNOSIS — N20.0 RENAL STONE: ICD-10-CM

## 2021-02-15 PROCEDURE — 74018 RADEX ABDOMEN 1 VIEW: CPT

## 2021-03-02 ENCOUNTER — TELEPHONE (OUTPATIENT)
Dept: ALLERGY | Facility: CLINIC | Age: 71
End: 2021-03-02

## 2021-03-02 NOTE — TELEPHONE ENCOUNTER
Spoke with patient, reviewed Dr. Osborne [place] message as below. He verbalizes understanding, questions answered. Will plan to proceed with the COVID-19 vaccine.      F/u for CIU scheduled for 3/11/21 at 11:30am.

## 2021-03-02 NOTE — TELEPHONE ENCOUNTER
Patient called and wanted to know about the new immunizations for Covid-19, if it was OK for him to get it. He read somewhere that some allergies that it is not a good idea to get the Covid Vaccine. Patient advised he get severe Chronic Hives.        Pl

## 2021-03-11 ENCOUNTER — TELEPHONE (OUTPATIENT)
Dept: ALLERGY | Facility: CLINIC | Age: 71
End: 2021-03-11

## 2021-03-11 ENCOUNTER — OFFICE VISIT (OUTPATIENT)
Dept: ALLERGY | Facility: CLINIC | Age: 71
End: 2021-03-11
Payer: MEDICARE

## 2021-03-11 ENCOUNTER — OFFICE VISIT (OUTPATIENT)
Dept: HEMATOLOGY/ONCOLOGY | Facility: HOSPITAL | Age: 71
End: 2021-03-11
Attending: ALLERGY & IMMUNOLOGY
Payer: MEDICARE

## 2021-03-11 VITALS
TEMPERATURE: 98 F | OXYGEN SATURATION: 97 % | DIASTOLIC BLOOD PRESSURE: 77 MMHG | RESPIRATION RATE: 16 BRPM | SYSTOLIC BLOOD PRESSURE: 143 MMHG | HEART RATE: 105 BPM

## 2021-03-11 VITALS
OXYGEN SATURATION: 98 % | HEART RATE: 92 BPM | TEMPERATURE: 99 F | SYSTOLIC BLOOD PRESSURE: 152 MMHG | RESPIRATION RATE: 16 BRPM | DIASTOLIC BLOOD PRESSURE: 86 MMHG

## 2021-03-11 DIAGNOSIS — Z79.899 VISIT FOR MONITORING XOLAIR THERAPY: ICD-10-CM

## 2021-03-11 DIAGNOSIS — L50.1 CHRONIC IDIOPATHIC URTICARIA: Primary | ICD-10-CM

## 2021-03-11 DIAGNOSIS — T78.3XXD ANGIOEDEMA, SUBSEQUENT ENCOUNTER: ICD-10-CM

## 2021-03-11 DIAGNOSIS — Z51.81 VISIT FOR MONITORING XOLAIR THERAPY: ICD-10-CM

## 2021-03-11 PROCEDURE — 96372 THER/PROPH/DIAG INJ SC/IM: CPT

## 2021-03-11 PROCEDURE — 99214 OFFICE O/P EST MOD 30 MIN: CPT | Performed by: ALLERGY & IMMUNOLOGY

## 2021-03-11 RX ORDER — EPINEPHRINE 0.3 MG/.3ML
INJECTION SUBCUTANEOUS
Qty: 1 EACH | Refills: 0 | Status: SHIPPED | OUTPATIENT
Start: 2021-03-11

## 2021-03-11 RX ORDER — FUROSEMIDE 20 MG/1
20 TABLET ORAL EVERY MORNING
COMMUNITY
Start: 2021-03-04

## 2021-03-11 RX ORDER — EPINEPHRINE 0.3 MG/.3ML
0.3 INJECTION SUBCUTANEOUS
Qty: 1 EACH | Refills: 0 | Status: SHIPPED | OUTPATIENT
Start: 2021-03-11

## 2021-03-11 RX ORDER — ALPRAZOLAM 0.25 MG/1
0.25 TABLET ORAL
COMMUNITY
Start: 2020-12-09

## 2021-03-11 RX ORDER — TAMSULOSIN HYDROCHLORIDE 0.4 MG/1
0.4 CAPSULE ORAL DAILY
COMMUNITY
Start: 2021-01-15

## 2021-03-11 RX ORDER — POTASSIUM CHLORIDE 750 MG/1
10 TABLET, FILM COATED, EXTENDED RELEASE ORAL DAILY
COMMUNITY
Start: 2021-03-04

## 2021-03-11 NOTE — TELEPHONE ENCOUNTER
RX given to patient. He reports [facility] had the best pricing from what he could find. He will contact our office if he has any trouble with the RX.

## 2021-03-11 NOTE — TELEPHONE ENCOUNTER
Call reviewed and noted. There are generic EpiPen's now. I can place a prescription for new EpiPen   including generic permissible  and he can price it out.

## 2021-03-11 NOTE — TELEPHONE ENCOUNTER
Patient contacted and informed per Dr. Brad Shetty below . . .    Call reviewed and noted. There are generic EpiPen's now. I can place a prescription for new EpiPen   including generic permissible  and he can price it out.        Patient states that he does not

## 2021-03-11 NOTE — PROGRESS NOTES
Having relief of itching with xolair. Administered to right lower abdomen. No bleeding noted-site left open to air. Observed for 30 minutes and then discharged stable.   Current order has one remaining injection left; had exam recently with Gabrielle Lovelace who in

## 2021-03-11 NOTE — TELEPHONE ENCOUNTER
Patient has epipen that  in 2021. He has only ever had to use it once in his whole life. He is wondering if he will be okay to just use the  one in case of an emergency as he does not have $300 for a new one.  He would be interested in any

## 2021-03-11 NOTE — PROGRESS NOTES
Kathy Rios is a 69year old male. HPI:   Patient presents with: Follow - Up: Jair SARMIENTO/yahir, Receives at the [facility].    Hives         Patient is a 71-year-old male who presents for follow-up with a chief complaint of hives/chronic urticar Comment: 2 times month    Drug use: No       Medications (Active prior to today's visit):  Current Outpatient Medications   Medication Sig Dispense Refill   • ALPRAZolam 0.25 MG Oral Tab Take 0.25 mg by mouth.      • HYDROcodone-acetaminophen 5-325 MG Or mouth daily. • Turmeric 500 MG Oral Cap Take 1,000 mg by mouth daily. • Vitamin B-12 (VITAMIN B12) 250 MCG Oral Tab Take 250 mcg by mouth daily. • Coenzyme Q10 (COQ10) 100 MG Oral Cap Take by mouth every 30 (thirty) days.        • furosemide 20 encounter diagnosis)  Angioedema, subsequent encounter  Visit for monitoring xolair therapy    Chronic idiopathic urticaria/angioedema  No ED visits or prednisone in the interim for underlying hives or angioedema.   Patient reports hives on average once thuy

## 2021-03-11 NOTE — TELEPHONE ENCOUNTER
Patient would like to  a printed RX for a generic epi pen. Patient is in the area. Notified he may need to wait, but we will work on getting a RX printed. Routed to Dr. Fatemeh Hickman. New RX pended for a printed RX. Dr. Fatemeh Hickman please review and sign.

## 2021-03-12 DIAGNOSIS — Z23 NEED FOR VACCINATION: ICD-10-CM

## 2021-03-21 ENCOUNTER — IMMUNIZATION (OUTPATIENT)
Dept: LAB | Age: 71
End: 2021-03-21

## 2021-03-21 DIAGNOSIS — Z23 NEED FOR VACCINATION: Primary | ICD-10-CM

## 2021-03-21 PROCEDURE — 0001A COVID 19 PFIZER-BIONTECH: CPT

## 2021-03-21 PROCEDURE — 91300 COVID 19 PFIZER-BIONTECH: CPT

## 2021-03-22 ENCOUNTER — ORDER TRANSCRIPTION (OUTPATIENT)
Dept: PHYSICAL THERAPY | Facility: HOSPITAL | Age: 71
End: 2021-03-22

## 2021-03-22 DIAGNOSIS — S67.190A: Primary | ICD-10-CM

## 2021-03-24 ENCOUNTER — TELEPHONE (OUTPATIENT)
Dept: PHYSICAL THERAPY | Facility: HOSPITAL | Age: 71
End: 2021-03-24

## 2021-04-07 ENCOUNTER — APPOINTMENT (OUTPATIENT)
Dept: PHYSICAL THERAPY | Age: 71
End: 2021-04-07
Attending: PLASTIC SURGERY
Payer: MEDICARE

## 2021-04-08 ENCOUNTER — OFFICE VISIT (OUTPATIENT)
Dept: HEMATOLOGY/ONCOLOGY | Facility: HOSPITAL | Age: 71
End: 2021-04-08
Attending: ALLERGY & IMMUNOLOGY
Payer: MEDICARE

## 2021-04-08 VITALS
DIASTOLIC BLOOD PRESSURE: 89 MMHG | RESPIRATION RATE: 16 BRPM | OXYGEN SATURATION: 99 % | SYSTOLIC BLOOD PRESSURE: 151 MMHG | HEART RATE: 86 BPM | TEMPERATURE: 98 F

## 2021-04-08 DIAGNOSIS — L50.1 CHRONIC IDIOPATHIC URTICARIA: Primary | ICD-10-CM

## 2021-04-08 PROCEDURE — 96372 THER/PROPH/DIAG INJ SC/IM: CPT

## 2021-04-08 NOTE — PROGRESS NOTES
Having relief of itching with xolair. Administered to left lower abdomen. No bleeding noted-site left open to air. Observed for 30 minutes and then discharged stable.   Current order has one remaining injection left; last injection on this order scheduled-

## 2021-04-12 ENCOUNTER — APPOINTMENT (OUTPATIENT)
Dept: PHYSICAL THERAPY | Age: 71
End: 2021-04-12
Attending: PLASTIC SURGERY
Payer: MEDICARE

## 2021-04-14 ENCOUNTER — APPOINTMENT (OUTPATIENT)
Dept: PHYSICAL THERAPY | Age: 71
End: 2021-04-14
Payer: MEDICARE

## 2021-04-18 ENCOUNTER — IMMUNIZATION (OUTPATIENT)
Dept: LAB | Age: 71
End: 2021-04-18
Attending: HOSPITALIST

## 2021-04-18 DIAGNOSIS — Z23 NEED FOR VACCINATION: Primary | ICD-10-CM

## 2021-04-18 PROCEDURE — 0002A COVID 19 PFIZER-BIONTECH: CPT | Performed by: HOSPITALIST

## 2021-04-18 PROCEDURE — 91300 COVID 19 PFIZER-BIONTECH: CPT | Performed by: HOSPITALIST

## 2021-04-19 ENCOUNTER — APPOINTMENT (OUTPATIENT)
Dept: PHYSICAL THERAPY | Age: 71
End: 2021-04-19
Payer: MEDICARE

## 2021-04-21 ENCOUNTER — APPOINTMENT (OUTPATIENT)
Dept: PHYSICAL THERAPY | Age: 71
End: 2021-04-21
Payer: MEDICARE

## 2021-04-26 ENCOUNTER — APPOINTMENT (OUTPATIENT)
Dept: PHYSICAL THERAPY | Age: 71
End: 2021-04-26
Payer: MEDICARE

## 2021-05-06 ENCOUNTER — APPOINTMENT (OUTPATIENT)
Dept: HEMATOLOGY/ONCOLOGY | Facility: HOSPITAL | Age: 71
End: 2021-05-06
Attending: ALLERGY & IMMUNOLOGY
Payer: MEDICARE

## 2021-08-03 ENCOUNTER — HOSPITAL ENCOUNTER (OUTPATIENT)
Dept: MRI IMAGING | Facility: HOSPITAL | Age: 71
Discharge: HOME | End: 2021-08-03
Attending: Other
Payer: MEDICARE

## 2021-08-03 DIAGNOSIS — R42 VERTIGO: ICD-10-CM

## 2021-08-03 LAB — CREAT BLD-MCNC: 1.1 MG/DL

## 2021-08-03 PROCEDURE — 70549 MR ANGIOGRAPH NECK W/O&W/DYE: CPT | Performed by: OTHER

## 2021-08-03 PROCEDURE — 82565 ASSAY OF CREATININE: CPT

## 2021-08-03 PROCEDURE — 70553 MRI BRAIN STEM W/O & W/DYE: CPT | Performed by: OTHER

## 2021-08-03 PROCEDURE — A9575 INJ GADOTERATE MEGLUMI 0.1ML: HCPCS | Performed by: OTHER

## 2021-09-23 ENCOUNTER — TELEPHONE (OUTPATIENT)
Dept: ALLERGY | Facility: CLINIC | Age: 71
End: 2021-09-23

## 2021-09-23 NOTE — TELEPHONE ENCOUNTER
RN called pt to notify him that he is due for routine follow up for his Xolair. RN left voicemail and provided call back number to schedule.

## 2021-12-13 NOTE — TELEPHONE ENCOUNTER
RN called pt to notify him that he is due for 6 month follow up for Xolair. Pt reports that he discontinued his Xolair in the summer and has been mostly hive free.    Per Dr. Ferrer Round last office visit note on 3/11/2021 he advised pt may discontinue if he w

## 2021-12-15 ENCOUNTER — TELEPHONE (OUTPATIENT)
Dept: ALLERGY | Facility: CLINIC | Age: 71
End: 2021-12-15

## 2021-12-15 NOTE — TELEPHONE ENCOUNTER
Patient is requesting a follow up appt in December, if possible a video appt. Patient cancelled January 2022 appointment.

## 2021-12-18 ENCOUNTER — TELEMEDICINE (OUTPATIENT)
Dept: ALLERGY | Facility: CLINIC | Age: 71
End: 2021-12-18

## 2021-12-18 DIAGNOSIS — Z23 FLU VACCINE NEED: ICD-10-CM

## 2021-12-18 DIAGNOSIS — L50.1 CHRONIC IDIOPATHIC URTICARIA: Primary | ICD-10-CM

## 2021-12-18 DIAGNOSIS — Z23 COVID-19 VACCINE SERIES STARTED: ICD-10-CM

## 2021-12-18 DIAGNOSIS — T78.3XXD ANGIOEDEMA, SUBSEQUENT ENCOUNTER: ICD-10-CM

## 2021-12-18 PROCEDURE — 99213 OFFICE O/P EST LOW 20 MIN: CPT | Performed by: ALLERGY & IMMUNOLOGY

## 2021-12-18 NOTE — PROGRESS NOTES
Shaun Starkey is a 66year old male. HPI:   No chief complaint on file.      Patient is a 65-year-old male who presents for follow-up via video visit    This visit is conducted using Telemedicine with live, interactive video and audio during this essential hypertension       Past Surgical History:   Procedure Laterality Date   • ANESTH,SURGERY OF SHOULDER      right shoulder   • CATARACT      left eye   • CORRECT BUNION,SIMPLE      left foot      Family History   Problem Relation Age of Onset   • C 5 MG Oral Tab Take 10 mg by mouth every evening. • Montelukast Sodium (SINGULAIR) 10 MG Oral Tab Take 1 tablet (10 mg total) by mouth nightly.  30 tablet 0   • diphenhydrAMINE HCl 25 MG Oral Tab Take 75 mg by mouth every 6 (six) hours as needed for Itch acute distress noted  Head/Face: NC/Atraumatic  Speaking in full sentences no increased work of breathing  A&O x 3   ASSESSMENT/PLAN:   Assessment   Chronic idiopathic urticaria  (primary encounter diagnosis)  Angioedema, subsequent encounter  Covid-19 vac

## 2022-08-01 ENCOUNTER — LAB ENCOUNTER (OUTPATIENT)
Dept: LAB | Facility: HOSPITAL | Age: 72
End: 2022-08-01
Attending: INTERNAL MEDICINE
Payer: MEDICARE

## 2022-08-01 DIAGNOSIS — Z01.818 PRE-OP EXAM: Primary | ICD-10-CM

## 2022-08-01 LAB
ANION GAP SERPL CALC-SCNC: 7 MMOL/L (ref 0–18)
BILIRUB UR QL: NEGATIVE
BUN BLD-MCNC: 12 MG/DL (ref 7–18)
BUN/CREAT SERPL: 14.1 (ref 10–20)
CALCIUM BLD-MCNC: 9.1 MG/DL (ref 8.5–10.1)
CHLORIDE SERPL-SCNC: 111 MMOL/L (ref 98–112)
CLARITY UR: CLEAR
CO2 SERPL-SCNC: 24 MMOL/L (ref 21–32)
COLOR UR: YELLOW
CREAT BLD-MCNC: 0.85 MG/DL
FASTING STATUS PATIENT QL REPORTED: YES
GLUCOSE BLD-MCNC: 121 MG/DL (ref 70–99)
GLUCOSE UR-MCNC: NEGATIVE MG/DL
HGB UR QL STRIP.AUTO: NEGATIVE
KETONES UR-MCNC: NEGATIVE MG/DL
LEUKOCYTE ESTERASE UR QL STRIP.AUTO: NEGATIVE
NITRITE UR QL STRIP.AUTO: NEGATIVE
OSMOLALITY SERPL CALC.SUM OF ELEC: 295 MOSM/KG (ref 275–295)
PH UR: 5.5 [PH] (ref 5–8)
POTASSIUM SERPL-SCNC: 3.6 MMOL/L (ref 3.5–5.1)
PROT UR-MCNC: NEGATIVE MG/DL
SODIUM SERPL-SCNC: 142 MMOL/L (ref 136–145)
SP GR UR STRIP: 1.02 (ref 1–1.03)
UROBILINOGEN UR STRIP-ACNC: 0.2

## 2022-08-01 PROCEDURE — 36415 COLL VENOUS BLD VENIPUNCTURE: CPT

## 2022-08-01 PROCEDURE — 80048 BASIC METABOLIC PNL TOTAL CA: CPT

## 2022-08-01 PROCEDURE — 93005 ELECTROCARDIOGRAM TRACING: CPT

## 2022-08-01 PROCEDURE — 93010 ELECTROCARDIOGRAM REPORT: CPT | Performed by: INTERNAL MEDICINE

## 2022-08-01 PROCEDURE — 81003 URINALYSIS AUTO W/O SCOPE: CPT

## 2022-09-07 ENCOUNTER — HOSPITAL ENCOUNTER (EMERGENCY)
Facility: HOSPITAL | Age: 72
Discharge: LEFT BEFORE DISCHARGE | End: 2022-09-07
Payer: MEDICARE

## 2022-09-07 VITALS
TEMPERATURE: 98 F | HEIGHT: 68 IN | HEART RATE: 77 BPM | BODY MASS INDEX: 28.79 KG/M2 | DIASTOLIC BLOOD PRESSURE: 87 MMHG | WEIGHT: 190 LBS | RESPIRATION RATE: 20 BRPM | OXYGEN SATURATION: 98 % | SYSTOLIC BLOOD PRESSURE: 182 MMHG

## 2022-09-07 RX ORDER — TRAMADOL HYDROCHLORIDE 50 MG/1
50 TABLET ORAL EVERY 6 HOURS PRN
COMMUNITY

## 2022-09-07 NOTE — ED QUICK NOTES
Sling applied in triage to help with pain management. Per pt he felt some relief with using the sling.

## 2022-09-07 NOTE — ED INITIAL ASSESSMENT (HPI)
Pt c/o of L arm pain that started 2 weeks ago and progressively getting worst. Pt describes pain as a cramp in his L bicep that radiates down his arm. Pt had a left shoulder arthroscopy done on 8/24.

## 2023-02-04 ENCOUNTER — APPOINTMENT (OUTPATIENT)
Dept: GENERAL RADIOLOGY | Age: 73
End: 2023-02-04
Attending: NURSE PRACTITIONER
Payer: MEDICARE

## 2023-02-04 ENCOUNTER — HOSPITAL ENCOUNTER (OUTPATIENT)
Age: 73
Discharge: HOME | End: 2023-02-04
Payer: MEDICARE

## 2023-02-04 VITALS
TEMPERATURE: 98 F | HEART RATE: 80 BPM | HEIGHT: 68 IN | DIASTOLIC BLOOD PRESSURE: 83 MMHG | OXYGEN SATURATION: 98 % | SYSTOLIC BLOOD PRESSURE: 155 MMHG | BODY MASS INDEX: 31.67 KG/M2 | RESPIRATION RATE: 18 BRPM | WEIGHT: 209 LBS

## 2023-02-04 DIAGNOSIS — J20.8 VIRAL BRONCHITIS: ICD-10-CM

## 2023-02-04 DIAGNOSIS — R05.1 ACUTE COUGH: Primary | ICD-10-CM

## 2023-02-04 LAB
POCT INFLUENZA A: NEGATIVE
POCT INFLUENZA B: NEGATIVE
SARS-COV-2 RNA RESP QL NAA+PROBE: NOT DETECTED

## 2023-02-04 PROCEDURE — 99213 OFFICE O/P EST LOW 20 MIN: CPT | Performed by: NURSE PRACTITIONER

## 2023-02-04 PROCEDURE — 71046 X-RAY EXAM CHEST 2 VIEWS: CPT | Performed by: NURSE PRACTITIONER

## 2023-02-04 PROCEDURE — 94640 AIRWAY INHALATION TREATMENT: CPT | Performed by: NURSE PRACTITIONER

## 2023-02-04 PROCEDURE — U0002 COVID-19 LAB TEST NON-CDC: HCPCS | Performed by: NURSE PRACTITIONER

## 2023-02-04 PROCEDURE — 87502 INFLUENZA DNA AMP PROBE: CPT | Performed by: NURSE PRACTITIONER

## 2023-02-04 RX ORDER — PREDNISONE 20 MG/1
60 TABLET ORAL ONCE
Status: COMPLETED | OUTPATIENT
Start: 2023-02-04 | End: 2023-02-04

## 2023-02-04 RX ORDER — IPRATROPIUM BROMIDE AND ALBUTEROL SULFATE 2.5; .5 MG/3ML; MG/3ML
3 SOLUTION RESPIRATORY (INHALATION) ONCE
Status: COMPLETED | OUTPATIENT
Start: 2023-02-04 | End: 2023-02-04

## 2023-02-04 RX ORDER — PREDNISONE 20 MG/1
40 TABLET ORAL DAILY
Qty: 10 TABLET | Refills: 0 | Status: SHIPPED | OUTPATIENT
Start: 2023-02-04 | End: 2023-02-09

## 2023-02-04 RX ORDER — ALBUTEROL SULFATE 90 UG/1
2 AEROSOL, METERED RESPIRATORY (INHALATION) EVERY 4 HOURS PRN
Qty: 1 EACH | Refills: 0 | Status: SHIPPED | OUTPATIENT
Start: 2023-02-04 | End: 2023-03-06

## 2023-02-04 RX ORDER — BENZONATATE 100 MG/1
100 CAPSULE ORAL 3 TIMES DAILY PRN
Qty: 10 CAPSULE | Refills: 0 | Status: SHIPPED | OUTPATIENT
Start: 2023-02-04

## 2023-02-04 RX ORDER — INHALER, ASSIST DEVICES
SPACER (EA) MISCELLANEOUS
Qty: 1 EACH | Refills: 0 | Status: SHIPPED | OUTPATIENT
Start: 2023-02-04

## 2023-02-04 NOTE — ED INITIAL ASSESSMENT (HPI)
Pt presents with cough, body aches, chills, sore throat x 1 week. Pt \"not feeling well\" since 12/22 with flu-like symptoms.     Coughing up yellow sputum

## 2023-02-04 NOTE — DISCHARGE INSTRUCTIONS
Call your primary doctor to schedule close follow-up next week. Take the steroids daily starting tomorrow. Use the inhaler as needed for cough, wheezing, shortness of breath. Use the cough medicine as needed. Drink plenty of water. Vicks on your chest.  Humidifier in your room.

## 2023-04-11 ENCOUNTER — LAB ENCOUNTER (OUTPATIENT)
Dept: LAB | Age: 73
End: 2023-04-11
Attending: INTERNAL MEDICINE
Payer: MEDICARE

## 2023-04-11 DIAGNOSIS — Z83.3 FAMILY HISTORY OF DIABETES MELLITUS (DM): Primary | ICD-10-CM

## 2023-04-11 LAB
EST. AVERAGE GLUCOSE BLD GHB EST-MCNC: 111 MG/DL (ref 68–126)
HBA1C MFR BLD: 5.5 % (ref ?–5.7)

## 2023-04-11 PROCEDURE — 36415 COLL VENOUS BLD VENIPUNCTURE: CPT

## 2023-04-11 PROCEDURE — 83036 HEMOGLOBIN GLYCOSYLATED A1C: CPT

## 2023-05-18 ENCOUNTER — HOSPITAL ENCOUNTER (OUTPATIENT)
Age: 73
Discharge: HOME | End: 2023-05-18
Payer: MEDICARE

## 2023-05-18 ENCOUNTER — APPOINTMENT (OUTPATIENT)
Dept: GENERAL RADIOLOGY | Age: 73
End: 2023-05-18
Payer: MEDICARE

## 2023-05-18 VITALS
DIASTOLIC BLOOD PRESSURE: 82 MMHG | RESPIRATION RATE: 18 BRPM | TEMPERATURE: 99 F | OXYGEN SATURATION: 99 % | HEART RATE: 76 BPM | SYSTOLIC BLOOD PRESSURE: 155 MMHG

## 2023-05-18 DIAGNOSIS — M25.551 PAIN OF RIGHT HIP: ICD-10-CM

## 2023-05-18 DIAGNOSIS — M15.9 OSTEOARTHRITIS OF MULTIPLE JOINTS, UNSPECIFIED OSTEOARTHRITIS TYPE: Primary | ICD-10-CM

## 2023-05-18 PROCEDURE — 73502 X-RAY EXAM HIP UNI 2-3 VIEWS: CPT

## 2023-05-18 RX ORDER — MELOXICAM 5 MG/1
5 CAPSULE ORAL DAILY
Qty: 30 CAPSULE | Refills: 0 | Status: SHIPPED | OUTPATIENT
Start: 2023-05-18 | End: 2023-06-17

## 2023-05-18 NOTE — ED INITIAL ASSESSMENT (HPI)
Pt reports right hip/groin pain x 1 year that has been worsening. Denies injury or trauma. Taking ibuprofen with minimal relief.

## 2023-05-18 NOTE — DISCHARGE INSTRUCTIONS
There is no fracture on your x-ray. Your x-ray does show some osteoarthritis. I sent a prescription for meloxicam for your pain. You should use this in place of ibuprofen. If you need anything else for pain control you can use plain Tylenol. Rest, ice and elevate. If you develop any numbness, tingling, acutely worsening pain, swelling or any other concerning complaints you should go to the emergency department. If you have persistent pain for more than the next week make an appointment to see the physical medicine specialist.  Otherwise follow-up with your primary care doctor.

## 2023-06-06 NOTE — PROGRESS NOTES
Pt arrived to infusion for Xolair SQ injection ([id number]) accompanied by spouse. Xolair prescribed to manage urticaria of unknown etiology. Patient states his injections are working well.  His biggest complaint now is that he had rhinoplasty done 2 weeks ago
,

## 2023-06-27 ENCOUNTER — MED REC SCAN ONLY (OUTPATIENT)
Dept: ALLERGY | Facility: CLINIC | Age: 73
End: 2023-06-27

## 2023-09-05 NOTE — TELEPHONE ENCOUNTER
Awaiting \"Adult Food Allergy Profile. \"    ----- Message from Gerhardt Ped, MD sent at 8/20/2019  7:21 AM CDT -----  Please call pt with normal c4 level.  This is good news Oriented - self; Oriented - place; Oriented - time

## 2023-09-19 ENCOUNTER — LAB ENCOUNTER (OUTPATIENT)
Dept: LAB | Age: 73
End: 2023-09-19
Attending: INTERNAL MEDICINE
Payer: MEDICARE

## 2023-09-19 ENCOUNTER — OFFICE VISIT (OUTPATIENT)
Dept: INTERNAL MEDICINE CLINIC | Facility: CLINIC | Age: 73
End: 2023-09-19

## 2023-09-19 VITALS
HEIGHT: 68 IN | WEIGHT: 200 LBS | DIASTOLIC BLOOD PRESSURE: 83 MMHG | HEART RATE: 82 BPM | SYSTOLIC BLOOD PRESSURE: 142 MMHG | BODY MASS INDEX: 30.31 KG/M2

## 2023-09-19 DIAGNOSIS — E55.9 VITAMIN D DEFICIENCY: ICD-10-CM

## 2023-09-19 DIAGNOSIS — Z13.0 SCREENING FOR ENDOCRINE, NUTRITIONAL, METABOLIC AND IMMUNITY DISORDER: ICD-10-CM

## 2023-09-19 DIAGNOSIS — M54.16 LUMBAR RADICULITIS: ICD-10-CM

## 2023-09-19 DIAGNOSIS — Z12.5 ENCOUNTER FOR SCREENING FOR MALIGNANT NEOPLASM OF PROSTATE: ICD-10-CM

## 2023-09-19 DIAGNOSIS — Z13.6 SCREENING, ISCHEMIC HEART DISEASE: ICD-10-CM

## 2023-09-19 DIAGNOSIS — Z12.11 COLON CANCER SCREENING: ICD-10-CM

## 2023-09-19 DIAGNOSIS — Z13.29 SCREENING FOR ENDOCRINE, NUTRITIONAL, METABOLIC AND IMMUNITY DISORDER: ICD-10-CM

## 2023-09-19 DIAGNOSIS — Z13.21 SCREENING FOR ENDOCRINE, NUTRITIONAL, METABOLIC AND IMMUNITY DISORDER: ICD-10-CM

## 2023-09-19 DIAGNOSIS — I10 PRIMARY HYPERTENSION: ICD-10-CM

## 2023-09-19 DIAGNOSIS — Z13.228 SCREENING FOR ENDOCRINE, NUTRITIONAL, METABOLIC AND IMMUNITY DISORDER: ICD-10-CM

## 2023-09-19 DIAGNOSIS — L50.1 CHRONIC IDIOPATHIC URTICARIA: Primary | ICD-10-CM

## 2023-09-19 PROBLEM — R22.0 TONGUE SWELLING: Status: RESOLVED | Noted: 2019-07-22 | Resolved: 2023-09-19

## 2023-09-19 LAB
ALBUMIN SERPL-MCNC: 3.9 G/DL (ref 3.4–5)
ALBUMIN/GLOB SERPL: 1.4 {RATIO} (ref 1–2)
ALP LIVER SERPL-CCNC: 122 U/L
ALT SERPL-CCNC: 24 U/L
ANION GAP SERPL CALC-SCNC: 6 MMOL/L (ref 0–18)
AST SERPL-CCNC: 21 U/L (ref 15–37)
BILIRUB SERPL-MCNC: 0.6 MG/DL (ref 0.1–2)
BUN BLD-MCNC: 12 MG/DL (ref 7–18)
CALCIUM BLD-MCNC: 9.5 MG/DL (ref 8.5–10.1)
CHLORIDE SERPL-SCNC: 111 MMOL/L (ref 98–112)
CHOLEST SERPL-MCNC: 220 MG/DL (ref ?–200)
CO2 SERPL-SCNC: 23 MMOL/L (ref 21–32)
CREAT BLD-MCNC: 0.88 MG/DL
EGFRCR SERPLBLD CKD-EPI 2021: 90 ML/MIN/1.73M2 (ref 60–?)
ERYTHROCYTE [DISTWIDTH] IN BLOOD BY AUTOMATED COUNT: 14.9 %
EST. AVERAGE GLUCOSE BLD GHB EST-MCNC: 111 MG/DL (ref 68–126)
FASTING PATIENT LIPID ANSWER: YES
FASTING STATUS PATIENT QL REPORTED: YES
GLOBULIN PLAS-MCNC: 2.8 G/DL (ref 2.8–4.4)
GLUCOSE BLD-MCNC: 97 MG/DL (ref 70–99)
HBA1C MFR BLD: 5.5 % (ref ?–5.7)
HCT VFR BLD AUTO: 50.2 %
HDLC SERPL-MCNC: 73 MG/DL (ref 40–59)
HGB BLD-MCNC: 15.7 G/DL
LDLC SERPL CALC-MCNC: 134 MG/DL (ref ?–100)
MCH RBC QN AUTO: 26.5 PG (ref 26–34)
MCHC RBC AUTO-ENTMCNC: 31.3 G/DL (ref 31–37)
MCV RBC AUTO: 84.7 FL
NONHDLC SERPL-MCNC: 147 MG/DL (ref ?–130)
OSMOLALITY SERPL CALC.SUM OF ELEC: 290 MOSM/KG (ref 275–295)
PLATELET # BLD AUTO: 164 10(3)UL (ref 150–450)
POTASSIUM SERPL-SCNC: 4.3 MMOL/L (ref 3.5–5.1)
PROT SERPL-MCNC: 6.7 G/DL (ref 6.4–8.2)
RBC # BLD AUTO: 5.93 X10(6)UL
SODIUM SERPL-SCNC: 140 MMOL/L (ref 136–145)
TRIGL SERPL-MCNC: 77 MG/DL (ref 30–149)
TSI SER-ACNC: 2.16 MIU/ML (ref 0.36–3.74)
VIT D+METAB SERPL-MCNC: 28.6 NG/ML (ref 30–100)
VLDLC SERPL CALC-MCNC: 14 MG/DL (ref 0–30)
WBC # BLD AUTO: 6.4 X10(3) UL (ref 4–11)

## 2023-09-19 PROCEDURE — 36415 COLL VENOUS BLD VENIPUNCTURE: CPT | Performed by: INTERNAL MEDICINE

## 2023-09-19 PROCEDURE — 99205 OFFICE O/P NEW HI 60 MIN: CPT | Performed by: INTERNAL MEDICINE

## 2023-09-19 PROCEDURE — 83036 HEMOGLOBIN GLYCOSYLATED A1C: CPT | Performed by: INTERNAL MEDICINE

## 2023-09-19 PROCEDURE — 82306 VITAMIN D 25 HYDROXY: CPT | Performed by: INTERNAL MEDICINE

## 2023-09-19 PROCEDURE — 85027 COMPLETE CBC AUTOMATED: CPT | Performed by: INTERNAL MEDICINE

## 2023-09-19 PROCEDURE — 80053 COMPREHEN METABOLIC PANEL: CPT | Performed by: INTERNAL MEDICINE

## 2023-09-19 PROCEDURE — 84443 ASSAY THYROID STIM HORMONE: CPT | Performed by: INTERNAL MEDICINE

## 2023-09-19 PROCEDURE — 80061 LIPID PANEL: CPT | Performed by: INTERNAL MEDICINE

## 2023-09-19 RX ORDER — PREDNISONE 10 MG/1
TABLET ORAL
Qty: 18 TABLET | Refills: 0 | Status: SHIPPED | OUTPATIENT
Start: 2023-09-19 | End: 2023-09-27

## 2023-09-19 RX ORDER — CYCLOBENZAPRINE HCL 5 MG
TABLET ORAL 3 TIMES DAILY PRN
Qty: 90 TABLET | Refills: 1 | Status: SHIPPED | OUTPATIENT
Start: 2023-09-19

## 2023-09-19 RX ORDER — TELMISARTAN 40 MG/1
40 TABLET ORAL NIGHTLY
Qty: 90 TABLET | Refills: 9 | Status: SHIPPED | OUTPATIENT
Start: 2023-09-19

## 2023-09-20 ENCOUNTER — HOSPITAL ENCOUNTER (OUTPATIENT)
Dept: CT IMAGING | Age: 73
Discharge: HOME | End: 2023-09-20
Attending: INTERNAL MEDICINE

## 2023-09-20 DIAGNOSIS — Z13.6 SCREENING, ISCHEMIC HEART DISEASE: ICD-10-CM

## 2023-09-28 ENCOUNTER — MED REC SCAN ONLY (OUTPATIENT)
Dept: INTERNAL MEDICINE CLINIC | Facility: CLINIC | Age: 73
End: 2023-09-28

## 2023-10-23 ENCOUNTER — OFFICE VISIT (OUTPATIENT)
Dept: INTERNAL MEDICINE CLINIC | Facility: CLINIC | Age: 73
End: 2023-10-23

## 2023-10-23 VITALS
DIASTOLIC BLOOD PRESSURE: 86 MMHG | HEART RATE: 80 BPM | BODY MASS INDEX: 30.77 KG/M2 | OXYGEN SATURATION: 97 % | WEIGHT: 203 LBS | SYSTOLIC BLOOD PRESSURE: 138 MMHG | HEIGHT: 68 IN

## 2023-10-23 DIAGNOSIS — E55.9 VITAMIN D DEFICIENCY: ICD-10-CM

## 2023-10-23 DIAGNOSIS — Z00.00 ENCOUNTER FOR MEDICARE ANNUAL WELLNESS EXAM: Primary | ICD-10-CM

## 2023-10-23 DIAGNOSIS — M48.02 CERVICAL STENOSIS OF SPINE: ICD-10-CM

## 2023-10-23 DIAGNOSIS — Z12.11 COLON CANCER SCREENING: ICD-10-CM

## 2023-10-23 DIAGNOSIS — J45.41 MODERATE PERSISTENT ASTHMA WITH ACUTE EXACERBATION: ICD-10-CM

## 2023-10-23 DIAGNOSIS — M54.16 LUMBAR RADICULITIS: ICD-10-CM

## 2023-10-23 DIAGNOSIS — Z23 ENCOUNTER FOR IMMUNIZATION: ICD-10-CM

## 2023-10-23 DIAGNOSIS — I10 PRIMARY HYPERTENSION: ICD-10-CM

## 2023-10-23 DIAGNOSIS — R06.81 WITNESSED EPISODE OF APNEA: ICD-10-CM

## 2023-10-23 DIAGNOSIS — L50.1 CHRONIC IDIOPATHIC URTICARIA: ICD-10-CM

## 2023-10-23 DIAGNOSIS — Z12.5 ENCOUNTER FOR SCREENING FOR MALIGNANT NEOPLASM OF PROSTATE: ICD-10-CM

## 2023-10-23 DIAGNOSIS — E78.2 MIXED HYPERLIPIDEMIA: ICD-10-CM

## 2023-10-23 RX ORDER — PREDNISONE 20 MG/1
20 TABLET ORAL 2 TIMES DAILY WITH MEALS
Qty: 14 TABLET | Refills: 0 | Status: SHIPPED | OUTPATIENT
Start: 2023-10-23 | End: 2023-10-30

## 2023-10-23 RX ORDER — FLUTICASONE PROPIONATE AND SALMETEROL 250; 50 UG/1; UG/1
1 POWDER RESPIRATORY (INHALATION) EVERY 12 HOURS SCHEDULED
Qty: 3 EACH | Refills: 3 | Status: SHIPPED | OUTPATIENT
Start: 2023-10-23

## 2023-10-23 RX ORDER — TELMISARTAN 40 MG/1
40 TABLET ORAL NIGHTLY
Qty: 90 TABLET | Refills: 9 | Status: SHIPPED | OUTPATIENT
Start: 2023-10-23

## 2023-10-23 RX ORDER — ROSUVASTATIN CALCIUM 10 MG/1
10 TABLET, COATED ORAL NIGHTLY
Qty: 90 TABLET | Refills: 9 | Status: SHIPPED | OUTPATIENT
Start: 2023-10-23

## 2023-10-23 RX ORDER — ZOSTER VACCINE RECOMBINANT, ADJUVANTED 50 MCG/0.5
50 KIT INTRAMUSCULAR ONCE
Qty: 2 EACH | Refills: 0 | Status: SHIPPED | OUTPATIENT
Start: 2023-10-23 | End: 2023-10-23

## 2023-11-06 ENCOUNTER — HOSPITAL ENCOUNTER (OUTPATIENT)
Dept: MRI IMAGING | Age: 73
Discharge: HOME | End: 2023-11-06
Attending: INTERNAL MEDICINE
Payer: MEDICARE

## 2023-11-06 DIAGNOSIS — M54.16 LUMBAR RADICULITIS: ICD-10-CM

## 2023-11-06 PROCEDURE — 72148 MRI LUMBAR SPINE W/O DYE: CPT | Performed by: INTERNAL MEDICINE

## 2023-11-08 ENCOUNTER — OFFICE VISIT (OUTPATIENT)
Dept: PHYSICAL THERAPY | Age: 73
End: 2023-11-08
Attending: INTERNAL MEDICINE
Payer: MEDICARE

## 2023-11-08 DIAGNOSIS — M54.16 LUMBAR RADICULITIS: Primary | ICD-10-CM

## 2023-11-08 PROCEDURE — 97110 THERAPEUTIC EXERCISES: CPT | Performed by: PHYSICAL THERAPIST

## 2023-11-08 PROCEDURE — 97162 PT EVAL MOD COMPLEX 30 MIN: CPT | Performed by: PHYSICAL THERAPIST

## 2023-11-14 ENCOUNTER — OFFICE VISIT (OUTPATIENT)
Dept: PHYSICAL THERAPY | Age: 73
End: 2023-11-14
Attending: INTERNAL MEDICINE
Payer: MEDICARE

## 2023-11-14 PROCEDURE — 97110 THERAPEUTIC EXERCISES: CPT | Performed by: PHYSICAL THERAPIST

## 2023-11-14 NOTE — PROGRESS NOTES
Diagnosis: Lumbar radiculitis (M54.16)           Next MD visit: none scheduled  Fall Risk: standard         Precautions: n/a          Medication Changes since last visit?: No      Subjective: Patient complains of back pain today. Pt describes pain level 3/10. Objective:    Trunk ROM is minimally limited into all planes. B hip extension is severely limited past neutral. Rest of B hips grossly WFL. R knee 15 to 130, L knee 5 to 130      Assessment: Demonstrates improved mobility after therapeutic exercises. Still with some pain. Patient and PT goals are in progress.       Plan: Continue PT to decrease pain and improve tolerance to activities     11/14/2023  Visit#: 2/ Medicare   Thera Ex   X40min  - standing trunk extensions 10x  - supine single knee to chest 10x  - trunk rotation stretch in supine 10x  - calf stretch on slant board 30 sec x 3  - B heel raises on slant board 10 x 2  - LE abduction with yellow theraband resistance 10 x 2  - LE extension with yellow theraband resistance 10 x 2  - Shuttle B LE extensions 5 bands 10 x 3  - Shuttle single LE extensions 3 bands 10 x 2  - Nustep L5 x 10min with cues  - repeat standing trunk extensions 10x     Manual PT      Neuromuscular Re-education      Modalities                      Charges: EX3       Total Timed Treatment: 40 min  Total Treatment Time: 45 min

## 2023-11-16 ENCOUNTER — OFFICE VISIT (OUTPATIENT)
Dept: PHYSICAL THERAPY | Age: 73
End: 2023-11-16
Attending: INTERNAL MEDICINE
Payer: MEDICARE

## 2023-11-16 PROCEDURE — 97110 THERAPEUTIC EXERCISES: CPT | Performed by: PHYSICAL THERAPIST

## 2023-11-16 NOTE — PROGRESS NOTES
Diagnosis: Lumbar radiculitis (M54.16)           Next MD visit: none scheduled  Fall Risk: standard         Precautions: n/a          Medication Changes since last visit?: No      Subjective: Patient states his back is feeling better overall. Pt describes pain level 0/10. Objective:    Trunk ROM is minimally limited into all planes. B hip extension is severely limited past neutral. Rest of B hips grossly WFL. R knee 15 to 130, L knee 5 to 130  Gait: walks independently without a device. Does not present with any major gait deviations. Assessment: Demonstrates improved mobility after therapeutic exercises. Patient tolerating progression of therapeutic exercises well. Improved hip mobility with stretching. Patient and PT goals are in progress. Plan: Continue PT to decrease pain and improve tolerance to activities.      11/16/2023  Visit#: 3/ Medicare 11/14/2023  Visit#: 2/ Medicare   Thera Ex   X55min  - standing trunk extensions 10x  - hip flexor stretch on stairs 10 x 2  - seated hamstring stretch 10 x 5 sec hold  - calf stretch on slant board 30 sec x 3  - B heel raises on slant board 10 x 2  - LE abduction with yellow theraband resistance 10 x 2  - LE extension with yellow theraband resistance 10 x 2  - Shuttle B LE extensions 5 bands 10 x 3  - Shuttle single LE extensions 3 bands 10 x 2  - Nustep L5 x 10min with cues  - repeat standing trunk extensions 10x X40min  - standing trunk extensions 10x  - supine single knee to chest 10x  - trunk rotation stretch in supine 10x  - calf stretch on slant board 30 sec x 3  - B heel raises on slant board 10 x 2  - LE abduction with yellow theraband resistance 10 x 2  - LE extension with yellow theraband resistance 10 x 2  - Shuttle B LE extensions 5 bands 10 x 3  - Shuttle single LE extensions 3 bands 10 x 2  - Nustep L5 x 10min with cues  - repeat standing trunk extensions 10x                       Charges: EX4      Total Timed Treatment: 55 min  Total Treatment Time: 55 min

## 2023-11-20 ENCOUNTER — APPOINTMENT (OUTPATIENT)
Dept: PHYSICAL THERAPY | Age: 73
End: 2023-11-20
Attending: INTERNAL MEDICINE
Payer: MEDICARE

## 2023-11-22 ENCOUNTER — OFFICE VISIT (OUTPATIENT)
Dept: PHYSICAL THERAPY | Age: 73
End: 2023-11-22
Attending: INTERNAL MEDICINE
Payer: MEDICARE

## 2023-11-22 PROCEDURE — 97110 THERAPEUTIC EXERCISES: CPT | Performed by: PHYSICAL THERAPIST

## 2023-11-22 NOTE — PROGRESS NOTES
Diagnosis: Lumbar radiculitis (M54.16)           Next MD visit: none scheduled  Fall Risk: standard         Precautions: n/a          Medication Changes since last visit?: No      Subjective: Patient states his back and legs are feeling good. Pt describes pain level 0/10 at this time. Objective:    Trunk ROM is minimally limited into all planes. R knee 15 to 130, L knee 5 to 130  Gait: walks independently without a device. Does not present with any major gait deviations. Assessment: Continues to present with improved mobility after therapeutic exercises. Noted decreased R LE strength compared to L LE. Improved overall mobility. Patient and PT goals are in progress. Plan: Continue PT to decrease pain and improve tolerance to activities.      11/22/2023  Visit#: 4/ Medicare 11/16/2023  Visit#: 3/ Medicare 11/14/2023  Visit#: 2/ Medicare   Martha Beat  - hip flexor stretch on stairs 10 x 2  - calf stretch on slant board 30 sec x 3  - standing LE heel raises 10 x 2  - LE abduction with yellow theraband resistance 10 x 2  - LE extension with yellow theraband resistance 10 x 2  - forward step up on 8 inch step 10 x 2  - side step up on 8 inch step 10 x 2  - Shuttle B LE extensions 5 bands 10 x 3  - Shuttle single LE extensions 3 bands 10 x 2  - alternate forward lunges on BOSU 10 x 2  - Nustep L5 x 10min with cueing for posture   X55min  - standing trunk extensions 10x  - hip flexor stretch on stairs 10 x 2  - seated hamstring stretch 10 x 5 sec hold  - calf stretch on slant board 30 sec x 3  - B heel raises on slant board 10 x 2  - LE abduction with yellow theraband resistance 10 x 2  - LE extension with yellow theraband resistance 10 x 2  - Shuttle B LE extensions 5 bands 10 x 3  - Shuttle single LE extensions 3 bands 10 x 2  - Nustep L5 x 10min with cues  - repeat standing trunk extensions 10x X40min  - standing trunk extensions 10x  - supine single knee to chest 10x  - trunk rotation stretch in supine 10x  - calf stretch on slant board 30 sec x 3  - B heel raises on slant board 10 x 2  - LE abduction with yellow theraband resistance 10 x 2  - LE extension with yellow theraband resistance 10 x 2  - Shuttle B LE extensions 5 bands 10 x 3  - Shuttle single LE extensions 3 bands 10 x 2  - Nustep L5 x 10min with cues  - repeat standing trunk extensions 10x                         Charges: EX4      Total Timed Treatment: 55 min  Total Treatment Time: 55 min

## 2023-12-06 ENCOUNTER — OFFICE VISIT (OUTPATIENT)
Dept: PHYSICAL THERAPY | Age: 73
End: 2023-12-06
Attending: PHYSICAL THERAPIST
Payer: MEDICARE

## 2023-12-06 PROCEDURE — 97110 THERAPEUTIC EXERCISES: CPT | Performed by: PHYSICAL THERAPIST

## 2023-12-06 NOTE — PROGRESS NOTES
Diagnosis: Lumbar radiculitis (M54.16)           Next MD visit: none scheduled  Fall Risk: standard         Precautions: n/a          Medication Changes since last visit?: No      Subjective: Patient still complains of back pain with prolonged standing. Complains of pain on B thighs. Pt describes pain level 5/10 at this time. Objective:    Trunk ROM is minimally limited into all planes. R knee 15 to 130, L knee 5 to 130  Gait: walks independently without a device. Does not present with any major gait deviations. Assessment: Still with LE pain. Reports decreased pain after therapeutic exercises. Patient and PT goals are in progress. Plan: Continue PT to decrease pain and improve tolerance to activities.      12/6/2023  Visit#: 5/Medicare 11/22/2023  Visit#: 4/ Medicare 11/16/2023  Visit#: 3/ Medicare 11/14/2023  Visit#: 2/ Medicare   Thera Ex   X55min  - hip flexor stretch on stairs 10 x 2  - calf stretch on slant board 30 sec x 3  - standing LE heel raises 10 x 2  - LE abduction with yellow theraband resistance 10 x 2  - forward step up on 8 inch step 10 x 2  - side step up on 8 inch step 10 x 2  - LE abduction with red theraband resistance 10 x 2  - LE extensions with red theraband 10 x 2  - Shuttle B LE extensions 6 bands 10 x 3  - Shuttle single LE extensions 34bands 10 x 2  - forward lunges on BOSU 10 x 2  - seated knee flexion 10 x 2 10# 10 x 2 X55min  - hip flexor stretch on stairs 10 x 2  - calf stretch on slant board 30 sec x 3  - standing LE heel raises 10 x 2  - LE abduction with yellow theraband resistance 10 x 2  - LE extension with yellow theraband resistance 10 x 2  - forward step up on 8 inch step 10 x 2  - side step up on 8 inch step 10 x 2  - Shuttle B LE extensions 5 bands 10 x 3  - Shuttle single LE extensions 3 bands 10 x 2  - alternate forward lunges on BOSU 10 x 2  - Nustep L5 x 10min with cueing for posture   X55min  - standing trunk extensions 10x  - hip flexor stretch on stairs 10 x 2  - seated hamstring stretch 10 x 5 sec hold  - calf stretch on slant board 30 sec x 3  - B heel raises on slant board 10 x 2  - LE abduction with yellow theraband resistance 10 x 2  - LE extension with yellow theraband resistance 10 x 2  - Shuttle B LE extensions 5 bands 10 x 3  - Shuttle single LE extensions 3 bands 10 x 2  - Nustep L5 x 10min with cues  - repeat standing trunk extensions 10x X40min  - standing trunk extensions 10x  - supine single knee to chest 10x  - trunk rotation stretch in supine 10x  - calf stretch on slant board 30 sec x 3  - B heel raises on slant board 10 x 2  - LE abduction with yellow theraband resistance 10 x 2  - LE extension with yellow theraband resistance 10 x 2  - Shuttle B LE extensions 5 bands 10 x 3  - Shuttle single LE extensions 3 bands 10 x 2  - Nustep L5 x 10min with cues  - repeat standing trunk extensions 10x                           Charges: EX4      Total Timed Treatment: 55 min  Total Treatment Time: 55 min

## 2023-12-13 ENCOUNTER — OFFICE VISIT (OUTPATIENT)
Dept: PHYSICAL THERAPY | Age: 73
End: 2023-12-13
Attending: PHYSICAL THERAPIST
Payer: MEDICARE

## 2023-12-13 PROCEDURE — 97110 THERAPEUTIC EXERCISES: CPT | Performed by: PHYSICAL THERAPIST

## 2023-12-13 NOTE — PROGRESS NOTES
Diagnosis: Lumbar radiculitis (M54.16)           Next MD visit: none scheduled  Fall Risk: standard         Precautions: n/a          Medication Changes since last visit?: No      Subjective: Patient still complains of back pain with prolonged standing and sleeping on the bed. Complains of pain on B thighs. Pt describes pain level 5/10 at this time. Objective:    Trunk ROM is minimally limited into all planes. R knee 15 to 130, L knee 5 to 130  Gait: walks independently without a device. Does not present with any major gait deviations. Assessment: Still with LE pain. Reports decreased pain after therapeutic exercises. Patient and PT goals are in progress. Plan: Continue PT to decrease pain and improve tolerance to activities.      12/13/2023  Visit#: 6/Medicare 12/6/2023  Visit#: 5/Medicare 11/22/2023  Visit#: 4/ Medicare 11/16/2023  Visit#: 3/ Medicare   Thera Ex   X45min  - hip flexor stretch on stairs 10 x 2  - calf stretch on slant board 30 sec x 3  - standing LE heel raises  - LE abduction, extension with green theraband resistance 10 x 2  - forward step up on 8 inch step 10 x 2  - side step up on 8 inch step 10 x 2  - Shuttle B LE extensions 6 bands 10 x 3  - Shuttle single LE extensions 34bands 10 x 3  - Nustep L5 x 10min with cueing for posture   X55min  - hip flexor stretch on stairs 10 x 2  - calf stretch on slant board 30 sec x 3  - standing LE heel raises 10 x 2  - LE abduction with yellow theraband resistance 10 x 2  - forward step up on 8 inch step 10 x 2  - side step up on 8 inch step 10 x 2  - LE abduction with red theraband resistance 10 x 2  - LE extensions with red theraband 10 x 2  - Shuttle B LE extensions 6 bands 10 x 3  - Shuttle single LE extensions 34bands 10 x 2  - forward lunges on BOSU 10 x 2  - seated knee flexion 10 x 2 10# 10 x 2  - Nustep L5 x 10min with cueing for posture X55min  - hip flexor stretch on stairs 10 x 2  - calf stretch on slant board 30 sec x 3  - standing LE heel raises 10 x 2  - LE abduction with yellow theraband resistance 10 x 2  - LE extension with yellow theraband resistance 10 x 2  - forward step up on 8 inch step 10 x 2  - side step up on 8 inch step 10 x 2  - Shuttle B LE extensions 5 bands 10 x 3  - Shuttle single LE extensions 3 bands 10 x 2  - alternate forward lunges on BOSU 10 x 2  - Nustep L5 x 10min with cueing for posture   X55min  - standing trunk extensions 10x  - hip flexor stretch on stairs 10 x 2  - seated hamstring stretch 10 x 5 sec hold  - calf stretch on slant board 30 sec x 3  - B heel raises on slant board 10 x 2  - LE abduction with yellow theraband resistance 10 x 2  - LE extension with yellow theraband resistance 10 x 2  - Shuttle B LE extensions 5 bands 10 x 3  - Shuttle single LE extensions 3 bands 10 x 2  - Nustep L5 x 10min with cues  - repeat standing trunk extensions 10x                         Charges: EX3      Total Timed Treatment: 45 min  Total Treatment Time: 45 min

## 2023-12-15 ENCOUNTER — OFFICE VISIT (OUTPATIENT)
Dept: PHYSICAL THERAPY | Age: 73
End: 2023-12-15
Attending: PHYSICAL THERAPIST
Payer: MEDICARE

## 2023-12-15 PROCEDURE — 97110 THERAPEUTIC EXERCISES: CPT | Performed by: PHYSICAL THERAPIST

## 2023-12-15 PROCEDURE — 97140 MANUAL THERAPY 1/> REGIONS: CPT | Performed by: PHYSICAL THERAPIST

## 2023-12-15 NOTE — PROGRESS NOTES
Diagnosis: Lumbar radiculitis (M54.16)           Next MD visit: none scheduled  Fall Risk: standard         Precautions: n/a          Medication Changes since last visit?: No      Subjective: Patient still complains of back pain with prolonged standing and sleeping on the bed. Pt describes pain level 5/10 at this time. Objective:    Trunk ROM is minimally limited into all planes. R hip ROM with moderate limitations into extension  (+) tightness B hip flexors, calves    Assessment: Patient with significantly decreased pain after therapeutic exercises. Demonstrates understanding of HEP. Patient and PT goals are in progress. Plan: Continue PT to decrease pain and improve tolerance to activities.  Possible discharge to a home program after 1 more week of PT.     12/15/2023  Visit#: 7/Medicare 12/13/2023  Visit#: 6/Medicare 12/6/2023  Visit#: 5/Medicare   Thera Ex   45min  - standing trunk extensions 10x  - Manual PT  - R hip flexor stretch on stairs 10 x 2  - calf stretch on slant board 30 sec x 3  - B heel raises on slant board 10 x 2  - LE abduction, extension with yellow theraband resistance 10 x 3  - forward step up on BOSU 10x  2  - Shuttle B LE extension 5 bands 10 x 3  - Shuttle single LE extension 3-4 bands 10 x 3 X45min  - hip flexor stretch on stairs 10 x 2  - calf stretch on slant board 30 sec x 3  - standing LE heel raises  - LE abduction, extension with green theraband resistance 10 x 2  - forward step up on 8 inch step 10 x 2  - side step up on 8 inch step 10 x 2  - Shuttle B LE extensions 6 bands 10 x 3  - Shuttle single LE extensions 34bands 10 x 3  - Nustep L5 x 10min with cueing for posture   X55min  - hip flexor stretch on stairs 10 x 2  - calf stretch on slant board 30 sec x 3  - standing LE heel raises 10 x 2  - LE abduction with yellow theraband resistance 10 x 2  - forward step up on 8 inch step 10 x 2  - side step up on 8 inch step 10 x 2  - LE abduction with red theraband resistance 10 x 2  - LE extensions with red theraband 10 x 2  - Shuttle B LE extensions 6 bands 10 x 3  - Shuttle single LE extensions 34bands 10 x 2  - forward lunges on BOSU 10 x 2  - seated knee flexion 10 x 2 10# 10 x 2  - Nustep L5 x 10min with cueing for posture     Manual PT X10min  - lumbar spine mobilization into extension  - prone R knee flexion stretch with PT overpressure.                      Charges: EX3, Manual PT1      Total Timed Treatment: 55 min  Total Treatment Time: 55 min

## 2023-12-18 ENCOUNTER — OFFICE VISIT (OUTPATIENT)
Dept: PHYSICAL THERAPY | Age: 73
End: 2023-12-18
Attending: PHYSICAL THERAPIST
Payer: MEDICARE

## 2023-12-18 PROCEDURE — 97110 THERAPEUTIC EXERCISES: CPT | Performed by: PHYSICAL THERAPIST

## 2023-12-18 NOTE — PROGRESS NOTES
Diagnosis: Lumbar radiculitis (M54.16)           Next MD visit: none scheduled  Fall Risk: standard         Precautions: n/a          Medication Changes since last visit?: No      Subjective: Patient still complains of back pain with prolonged standing and sleeping on the bed. Pt describes pain level 5/10 at this time. Objective:    Trunk ROM is minimally limited into all planes. R hip ROM with moderate limitations into extension  (+) tightness B hip flexors, calves    Assessment: Patient with significantly decreased pain after therapeutic exercises. Demonstrates understanding of HEP. Patient and PT goals are in progress. Plan: Continue PT to decrease pain and improve tolerance to activities.  Possible discharge to a home program after 1 more week of PT.     12/18/2023  Visit#: 8/ Medicare 12/15/2023  Visit#: 7/Medicare 12/13/2023  Visit#: 6/Medicare   Thera Ex   45min  - hip flexor stretch on stairs 10 x 2  - calf stretch on slant board 30 sec x 3  - B heel raises on slant board 10 x 3  - LE abduction, extension with red theraband resistance 10 x 3  - forward step up on BOSU 10x  2  - Shuttle B LE extension 6 bands 10 x 3  - Shuttle single LE extension 3-4 bands 10 x 3  - Nustep L5 x 10min with cueing for posture 45min  - standing trunk extensions 10x  - Manual PT  - R hip flexor stretch on stairs 10 x 2  - calf stretch on slant board 30 sec x 3  - B heel raises on slant board 10 x 2  - LE abduction, extension with yellow theraband resistance 10 x 3  - forward step up on BOSU 10x  2  - Shuttle B LE extension 5 bands 10 x 3  - Shuttle single LE extension 3-4 bands 10 x 3  - Nustep L5 x 10min with cueing for posture   X45min  - hip flexor stretch on stairs 10 x 2  - calf stretch on slant board 30 sec x 3  - standing LE heel raises  - LE abduction, extension with green theraband resistance 10 x 2  - forward step up on 8 inch step 10 x 2  - side step up on 8 inch step 10 x 2  - Shuttle B LE extensions 6 bands 10 x 3  - Shuttle single LE extensions 34bands 10 x 3  - Nustep L5 x 10min with cueing for posture     Manual PT  X10min  - lumbar spine mobilization into extension  - prone R knee flexion stretch with PT overpressure.                   Charges: EX3     Total Timed Treatment: 45 min  Total Treatment Time: 45 min

## 2023-12-20 ENCOUNTER — OFFICE VISIT (OUTPATIENT)
Dept: PHYSICAL THERAPY | Age: 73
End: 2023-12-20
Attending: PHYSICAL THERAPIST
Payer: MEDICARE

## 2023-12-20 PROCEDURE — 97110 THERAPEUTIC EXERCISES: CPT | Performed by: PHYSICAL THERAPIST

## 2023-12-20 NOTE — PROGRESS NOTES
Progress Summary  Pt has attended 9 visits in Physical Therapy. Diagnosis: Lumbar radiculitis (M54.16)           Next MD visit: none scheduled  Fall Risk: standard         Precautions: n/a          Medication Changes since last visit?: No      Subjective: Patient still complains of back pain with prolonged standing and sleeping on the bed. Pt describes pain level 5/10 at this time. Objective:    Trunk ROM is minimally limited into all planes. R hip ROM with moderate limitations into extension  (+) tightness B hip flexors, calves    Assessment: Referred to PT for eval and tx due to back pain. Patient has completed 9 PT visits. States he is feeling better overall. Reports residual pain up to 5/10 on pain scale. States his hips are feeling better overall but still complains of low back pain with prolonged standing activities. He is independent with his home exercises, its progression, and management of residual symptoms. Patient and PT goals are partially met.        12/20/2023  Visit#: 9/Medicare 12/18/2023  Visit#: 8/ Medicare 12/15/2023  Visit#: 7/Medicare 12/13/2023  Visit#: 6/Medicare   Thera Ex   45min  - hip flexor stretch on stairs 10 x 2  - calf stretch on slant board 30 sec x 3  - B heel raises on slant board 10 x 3  - LE abduction, extension with red theraband resistance 10 x 3  - forward step up on BOSU 10 x 2  - Shuttle B LE extension 6 bands 10 x 3  - Shuttle single LE extension 3-4 bands 10 x 3  - reviewed HEP, printed new handouts  - Nustep L5 x 10min with cueing for posture 45min  - hip flexor stretch on stairs 10 x 2  - calf stretch on slant board 30 sec x 3  - B heel raises on slant board 10 x 3  - LE abduction, extension with red theraband resistance 10 x 3  - forward step up on BOSU 10x  2  - Shuttle B LE extension 6 bands 10 x 3  - Shuttle single LE extension 3-4 bands 10 x 3  - Nustep L5 x 10min with cueing for posture 45min  - standing trunk extensions 10x  - Manual PT  - R hip flexor stretch on stairs 10 x 2  - calf stretch on slant board 30 sec x 3  - B heel raises on slant board 10 x 2  - LE abduction, extension with yellow theraband resistance 10 x 3  - forward step up on BOSU 10x  2  - Shuttle B LE extension 5 bands 10 x 3  - Shuttle single LE extension 3-4 bands 10 x 3  - Nustep L5 x 10min with cueing for posture   X45min  - hip flexor stretch on stairs 10 x 2  - calf stretch on slant board 30 sec x 3  - standing LE heel raises  - LE abduction, extension with green theraband resistance 10 x 2  - forward step up on 8 inch step 10 x 2  - side step up on 8 inch step 10 x 2  - Shuttle B LE extensions 6 bands 10 x 3  - Shuttle single LE extensions 34bands 10 x 3  - Nustep L5 x 10min with cueing for posture     Manual PT   X10min  - lumbar spine mobilization into extension  - prone R knee flexion stretch with PT overpressure. Charges: EX3     Total Timed Treatment: 45 min  Total Treatment Time: 45 min     Plan: Discharged to a home program. Patient will follow up with MD due to persistant back pain. Patient/Family/Caregiver was advised of these findings, precautions, and treatment options and has agreed to actively participate in planning and for this course of care. Thank you for your referral. If you have any questions, please contact me at Dept: [phone].     Sincerely,  Electronically signed by therapist: Lizbet Madrid., PT

## 2024-01-15 ENCOUNTER — PATIENT MESSAGE (OUTPATIENT)
Facility: LOCATION | Age: 74
End: 2024-01-15

## 2024-01-16 NOTE — TELEPHONE ENCOUNTER
From: Daniel Quinonez Sr.  To: Ry Bland  Sent: 1/15/2024 10:33 AM CST  Subject: Travel to [place]    Dr Bland, this is Daniel Quinonez. I’m looking to travel to [facility], and or [facility] next month. Do you recommend inoculations?  If so, when and where?

## 2024-03-20 ENCOUNTER — MED REC SCAN ONLY (OUTPATIENT)
Facility: LOCATION | Age: 74
End: 2024-03-20

## 2024-07-29 ENCOUNTER — OFFICE VISIT (OUTPATIENT)
Facility: LOCATION | Age: 74
End: 2024-07-29
Payer: MEDICARE

## 2024-07-29 ENCOUNTER — HOSPITAL ENCOUNTER (EMERGENCY)
Facility: HOSPITAL | Age: 74
Discharge: HOME | End: 2024-07-29
Attending: EMERGENCY MEDICINE
Payer: MEDICARE

## 2024-07-29 ENCOUNTER — APPOINTMENT (OUTPATIENT)
Dept: GENERAL RADIOLOGY | Facility: HOSPITAL | Age: 74
End: 2024-07-29
Attending: EMERGENCY MEDICINE
Payer: MEDICARE

## 2024-07-29 VITALS
RESPIRATION RATE: 16 BRPM | DIASTOLIC BLOOD PRESSURE: 74 MMHG | SYSTOLIC BLOOD PRESSURE: 156 MMHG | HEIGHT: 68 IN | OXYGEN SATURATION: 98 % | HEART RATE: 62 BPM | TEMPERATURE: 98 F | BODY MASS INDEX: 30.46 KG/M2 | WEIGHT: 201 LBS

## 2024-07-29 VITALS
HEART RATE: 86 BPM | HEIGHT: 68 IN | DIASTOLIC BLOOD PRESSURE: 80 MMHG | SYSTOLIC BLOOD PRESSURE: 152 MMHG | OXYGEN SATURATION: 95 % | WEIGHT: 201.63 LBS | BODY MASS INDEX: 30.56 KG/M2

## 2024-07-29 DIAGNOSIS — H91.93 BILATERAL HEARING LOSS, UNSPECIFIED HEARING LOSS TYPE: ICD-10-CM

## 2024-07-29 DIAGNOSIS — I10 ACCELERATED HYPERTENSION: ICD-10-CM

## 2024-07-29 DIAGNOSIS — M99.79 FORAMINAL STENOSIS DUE TO INTERVERTEBRAL DISC DISEASE: ICD-10-CM

## 2024-07-29 DIAGNOSIS — M54.16 LUMBAR RADICULITIS: ICD-10-CM

## 2024-07-29 DIAGNOSIS — M25.50 POLYARTHRALGIA: ICD-10-CM

## 2024-07-29 DIAGNOSIS — M51.9 FORAMINAL STENOSIS DUE TO INTERVERTEBRAL DISC DISEASE: ICD-10-CM

## 2024-07-29 DIAGNOSIS — M79.10 MYALGIA: ICD-10-CM

## 2024-07-29 DIAGNOSIS — R07.9 CHEST PAIN OF UNCERTAIN ETIOLOGY: Primary | ICD-10-CM

## 2024-07-29 DIAGNOSIS — I25.9 CHEST PAIN DUE TO MYOCARDIAL ISCHEMIA, UNSPECIFIED ISCHEMIC CHEST PAIN TYPE: Primary | ICD-10-CM

## 2024-07-29 DIAGNOSIS — E78.2 MIXED HYPERLIPIDEMIA: ICD-10-CM

## 2024-07-29 DIAGNOSIS — R93.1 ELEVATED CORONARY ARTERY CALCIUM SCORE: ICD-10-CM

## 2024-07-29 LAB
ALBUMIN SERPL-MCNC: 4.4 G/DL (ref 3.2–4.8)
ALBUMIN/GLOB SERPL: 1.9 {RATIO} (ref 1–2)
ALP LIVER SERPL-CCNC: 93 U/L
ALT SERPL-CCNC: 24 U/L
ANION GAP SERPL CALC-SCNC: 6 MMOL/L (ref 0–18)
AST SERPL-CCNC: 40 U/L (ref ?–34)
BASOPHILS # BLD AUTO: 0.05 X10(3) UL (ref 0–0.2)
BASOPHILS NFR BLD AUTO: 0.8 %
BILIRUB SERPL-MCNC: 0.5 MG/DL (ref 0.2–1.1)
BUN BLD-MCNC: 11 MG/DL (ref 9–23)
BUN/CREAT SERPL: 11 (ref 10–20)
CALCIUM BLD-MCNC: 10.1 MG/DL (ref 8.7–10.4)
CHLORIDE SERPL-SCNC: 114 MMOL/L (ref 98–112)
CO2 SERPL-SCNC: 23 MMOL/L (ref 21–32)
CREAT BLD-MCNC: 1 MG/DL
DEPRECATED RDW RBC AUTO: 41.8 FL (ref 35.1–46.3)
EGFRCR SERPLBLD CKD-EPI 2021: 79 ML/MIN/1.73M2 (ref 60–?)
EOSINOPHIL # BLD AUTO: 0.11 X10(3) UL (ref 0–0.7)
EOSINOPHIL NFR BLD AUTO: 1.7 %
ERYTHROCYTE [DISTWIDTH] IN BLOOD BY AUTOMATED COUNT: 13.7 % (ref 11–15)
GLOBULIN PLAS-MCNC: 2.3 G/DL (ref 2–3.5)
GLUCOSE BLD-MCNC: 98 MG/DL (ref 70–99)
HCT VFR BLD AUTO: 46.8 %
HGB BLD-MCNC: 15.3 G/DL
IMM GRANULOCYTES # BLD AUTO: 0.02 X10(3) UL (ref 0–1)
IMM GRANULOCYTES NFR BLD: 0.3 %
LYMPHOCYTES # BLD AUTO: 1.75 X10(3) UL (ref 1–4)
LYMPHOCYTES NFR BLD AUTO: 26.6 %
MCH RBC QN AUTO: 27.2 PG (ref 26–34)
MCHC RBC AUTO-ENTMCNC: 32.7 G/DL (ref 31–37)
MCV RBC AUTO: 83.3 FL
MONOCYTES # BLD AUTO: 0.47 X10(3) UL (ref 0.1–1)
MONOCYTES NFR BLD AUTO: 7.1 %
NEUTROPHILS # BLD AUTO: 4.19 X10 (3) UL (ref 1.5–7.7)
NEUTROPHILS # BLD AUTO: 4.19 X10(3) UL (ref 1.5–7.7)
NEUTROPHILS NFR BLD AUTO: 63.5 %
OSMOLALITY SERPL CALC.SUM OF ELEC: 295 MOSM/KG (ref 275–295)
PLATELET # BLD AUTO: 150 10(3)UL (ref 150–450)
POTASSIUM SERPL-SCNC: 4.6 MMOL/L (ref 3.5–5.1)
PROT SERPL-MCNC: 6.7 G/DL (ref 5.7–8.2)
RBC # BLD AUTO: 5.62 X10(6)UL
SODIUM SERPL-SCNC: 143 MMOL/L (ref 136–145)
TROPONIN I SERPL HS-MCNC: 13 NG/L
WBC # BLD AUTO: 6.6 X10(3) UL (ref 4–11)

## 2024-07-29 PROCEDURE — 80053 COMPREHEN METABOLIC PANEL: CPT | Performed by: EMERGENCY MEDICINE

## 2024-07-29 PROCEDURE — 71045 X-RAY EXAM CHEST 1 VIEW: CPT | Performed by: EMERGENCY MEDICINE

## 2024-07-29 PROCEDURE — 85025 COMPLETE CBC W/AUTO DIFF WBC: CPT | Performed by: EMERGENCY MEDICINE

## 2024-07-29 PROCEDURE — 84484 ASSAY OF TROPONIN QUANT: CPT | Performed by: EMERGENCY MEDICINE

## 2024-07-29 PROCEDURE — 99284 EMERGENCY DEPT VISIT MOD MDM: CPT

## 2024-07-29 PROCEDURE — 36415 COLL VENOUS BLD VENIPUNCTURE: CPT

## 2024-07-29 PROCEDURE — 99214 OFFICE O/P EST MOD 30 MIN: CPT | Performed by: INTERNAL MEDICINE

## 2024-07-29 PROCEDURE — 99285 EMERGENCY DEPT VISIT HI MDM: CPT

## 2024-07-29 PROCEDURE — 93010 ELECTROCARDIOGRAM REPORT: CPT

## 2024-07-29 PROCEDURE — 93005 ELECTROCARDIOGRAM TRACING: CPT

## 2024-07-29 RX ORDER — PREGABALIN 50 MG/1
50 CAPSULE ORAL NIGHTLY
Qty: 90 CAPSULE | Refills: 0 | Status: SHIPPED | OUTPATIENT
Start: 2024-07-29

## 2024-07-29 RX ORDER — CYCLOBENZAPRINE HCL 5 MG
TABLET ORAL 3 TIMES DAILY PRN
Qty: 90 TABLET | Refills: 1 | Status: SHIPPED | OUTPATIENT
Start: 2024-07-29

## 2024-07-29 NOTE — PROGRESS NOTES
INTERNAL MEDICINE OFFICE NOTE     Patient ID: Daniel Quinonez Sr. is a 73 year old male.  Today's Date: 07/29/24  Chief Complaint: Pain (Patient here for Pain all over body )    Pain      Having aches and pains all over. Has not been seen in over 1 years, goes to [place] for part of the year. Taking rosuvastatin 10mg, last , due for repeat. We reviewed the MRI spine. Having lots of squeezing chest pains. Midsternal. Bilateral shoulders. Worsening. Occurs with exertion- feels fatigued but not short of breath, no palpitations.  He also states that he was mowing the lawn and he could not even get [facility] through without having to stop due to left-sided chest pain.  He loves mowing the lawn so this has been an issue for him.  He stops taking rosuvastatin 3 weeks ago due to his \"side effects \".  He has a hard time with physical activity due to hip pain/arthritis.        Impression   CONCLUSION:  1. Multilevel degenerative changes the lumbar spine with mild spinal canal stenosis at L1-L2, L2-L3, L3-L4, and L4-L5.     2. Severe left greater than right foraminal narrowing at L5-S1; additional multilevel subarticular zone and foraminal impingement as detailed above.     3. No acute osseous injury of the lumbar spine is apparent.     4. Lesser incidental findings as above.     Vitals:    07/29/24 1258   BP: 152/80   Pulse: 86   SpO2: 95%   Weight: 201 lb 9.6 oz (91.4 kg)   Height: 5' 8\" (1.727 m)     body mass index is 30.65 kg/m².  BP Readings from Last 3 Encounters:   07/29/24 152/80   10/23/23 138/86   09/19/23 142/83     The 10-year ASCVD risk score (Neeraj FLETCHER, et al., 2019) is: 26.5%    Values used to calculate the score:      Age: 73 years      Sex: Male      Is Non- : Yes      Diabetic: No      Tobacco smoker: No      Systolic Blood Pressure: 152 mmHg      Is BP treated: Yes      HDL Cholesterol: 73 mg/dL      Total Cholesterol: 220 mg/dL  Medications reviewed:  Current  Outpatient Medications   Medication Sig Dispense Refill    cyclobenzaprine 5 MG Oral Tab Take 1-2 tablets (5-10 mg total) by mouth 3 (three) times daily as needed for Muscle spasms. May cause sedation; no driving. 90 tablet 1    pregabalin (LYRICA) 50 MG Oral Cap Take 1 capsule (50 mg total) by mouth at bedtime. FOR NERVE PAIN. UPDATE ME VIA SmartCare system 2 WEEKS ON RESPONSE; SEE ME IN 4 WEEKS FOR REFILLS. 90 capsule 0    telmisartan 40 MG Oral Tab Take 1 tablet (40 mg total) by mouth nightly. FOR BLOOD PRESSURE. 90 tablet 9    rosuvastatin 10 MG Oral Tab Take 1 tablet (10 mg total) by mouth nightly. FOR CHOLESTEROL. 90 tablet 9    fluticasone-salmeterol (ADVAIR DISKUS) 250-50 MCG/ACT Inhalation Aerosol Powder, Breath Activated Inhale 1 puff into the lungs every 12 (twelve) hours. 3 each 3    Omalizumab (XOLAIR) 150 MG/ML Subcutaneous Solution Prefilled Syringe Inject 150 mg into the skin every 28 days. 1 mL 5         Assessment & Plan    1. Chest pain due to myocardial ischemia, unspecified ischemic chest pain type (Primary)  2. Myalgia  -     Comp Metabolic Panel (14); Future; Expected date: 07/29/2024  -     Rheumatoid Arthritis Factor; Future; Expected date: 07/29/2024  -     C-Reactive Protein; Future; Expected date: 07/29/2024  3. Polyarthralgia  -     CK (Creatine Kinase) (Not Creatinine); Future; Expected date: 07/29/2024  -     Comp Metabolic Panel (14); Future; Expected date: 07/29/2024  -     Rheumatoid Arthritis Factor; Future; Expected date: 07/29/2024  -     C-Reactive Protein; Future; Expected date: 07/29/2024  -     Uric Acid; Future; Expected date: 07/29/2024  4. Mixed hyperlipidemia  -     Lipid Panel; Future; Expected date: 07/29/2024  5. Foraminal stenosis due to intervertebral disc disease  -     Pregabalin; Take 1 capsule (50 mg total) by mouth at bedtime. FOR NERVE PAIN. UPDATE ME VIA SmartCare system 2 WEEKS ON RESPONSE; SEE ME IN 4 WEEKS FOR REFILLS.  Dispense: 90 capsule; Refill: 0  6. Lumbar  radiculitis  -     Cyclobenzaprine HCl; Take 1-2 tablets (5-10 mg total) by mouth 3 (three) times daily as needed for Muscle spasms. May cause sedation; no driving.  Dispense: 90 tablet; Refill: 1  7. Bilateral hearing loss, unspecified hearing loss type  -     ENT Referral - In Network  8. Elevated coronary artery calcium score  -     [facility] CARDIOLOGY EXTERNAL  Plan  At this time would like patient to go directly to [facility], we will contact them, he has typical cardiac symptoms and is unable to perform even lawnmowing without left-sided chest pain, calcium score was low previously and there may be some relation to his foraminal stenosis however we would like to rule out cardiac cause given his symptoms are worsening he will go directly to ER with [facility] cardiology on consult.  He is unable to perform treadmill stress test due to hip and given his symptoms he may benefit from advanced imaging depending on cardiology evaluation. We will call [facility] triage.     Follow Up: Return for DEPENDING ON RESULTS..         Objective: Results:   Physical Exam  Vitals and nursing note reviewed.   Constitutional:       General: He is not in acute distress.     Appearance: Normal appearance.   HENT:      Head: Normocephalic.      Right Ear: External ear normal.      Left Ear: External ear normal.   Eyes:      Extraocular Movements: Extraocular movements intact.      Conjunctiva/sclera: Conjunctivae normal.   Cardiovascular:      Rate and Rhythm: Normal rate and regular rhythm.      Pulses: Normal pulses.      Heart sounds: Normal heart sounds.   Pulmonary:      Effort: Pulmonary effort is normal. No respiratory distress.      Breath sounds: Normal breath sounds. No wheezing.   Chest:      Chest wall: No tenderness.   Abdominal:      General: Abdomen is flat. Bowel sounds are normal.      Tenderness: There is no abdominal tenderness.   Musculoskeletal:         General: Normal range of motion.      Cervical back:  Normal range of motion and neck supple.   Skin:     Coloration: Skin is not jaundiced.   Neurological:      General: No focal deficit present.      Mental Status: He is alert and oriented to person, place, and time. Mental status is at baseline.   Psychiatric:         Mood and Affect: Mood normal.         Behavior: Behavior normal.        Reviewed:    Patient Active Problem List    Diagnosis    Foraminal stenosis due to intervertebral disc disease    Witnessed episode of apnea    Mixed hyperlipidemia    Lumbar radiculitis    Primary hypertension    Chronic idiopathic urticaria    Cervical stenosis of spine      Allergies   Allergen Reactions    Alfuzosin SHORTNESS OF BREATH    Amlodipine HIVES    Hydrocodone-Acetaminophen OTHER (SEE COMMENTS)     Extreme upset stomach .        Social History     Socioeconomic History    Marital status:    Tobacco Use    Smoking status: Never     Passive exposure: Never    Smokeless tobacco: Never   Vaping Use    Vaping status: Never Used   Substance and Sexual Activity    Alcohol use: Yes     Alcohol/week: 3.0 - 4.0 standard drinks of alcohol     Types: 3 - 4 Standard drinks or equivalent per week     Comment: 2 times month    Drug use: No   Other Topics Concern    Caffeine Concern Yes     Comment: 10 cups coffee daily or ice tea or less    Exercise No   Social History Narrative    The patient does  use an assistive device.. Right wrist splint     The patient does live in a home with stairs.      Review of Systems  All other systems negative unless otherwise stated in ROS or HPI above.       Ry Bland MD  Internal Medicine       Call office with any questions or seek emergency care if necessary.   Patient understands and agrees to follow directions and advice.      ----------------------------------------- PATIENT INSTRUCTIONS-----------------------------------------   .    Patient Instructions   Because you are having chest pain with activity that requires you to stop  and is consistent with cardiac I would like for you to go to [facility] for workup and cardiology evaluation, including stress testing or PET scan depending on your ability to ambulate.  Please have [facility] on consult. - bridget            1.  I would like to start you on Lyrica i.e. pregabalin, this is a better form of gabapentin and should not cause many side effects and I would like for you to start with 50 mg nightly and a maximum dose is 100 to 200 mg 3 times a day but our first goal is to get you some relief at night and then see how the day goes.  I have many people who do just fine on 50 mg to 100 mg nightly.    2.  You may use the cyclobenzaprine as needed to help with breakthrough pain or spasms but if you prefer not to use it it is not needed.    3.  I would like for you to follow-up with the ear nose and throat physician for your ears, he will give you clearance    4.  I am going to draw some blood work on you today to evaluate for your symptoms, this is going to include a uric acid for gout, an inflammatory marker called C-reactive protein, rheumatoid arthritis creatinine kinase to check your muscles your kidney liver electrolytes along with your cholesterol.    Please stop your rosuvastatin for the next 1 to 2 weeks or until your symptoms resolve.  It is also possible the cholesterol medication is causing this.    5.  Ideally I would like to perform a cardiac workup however in those that are having symptoms we try to avoid treadmill stress testing due to the risk associated with physical activity and cardiac symptoms, your heart scan was only 49 last year which is very low therefore although your symptoms are consistent with some heart I am 50-50 given your previous evaluation was unremarkable.  Nonetheless I am going to have you see [facility] our cardiology service to have them discuss further with you in terms of cardiac testing.    Although right now it does not appear to be your  heart if at any point in time you have any sort of chest pain that radiates to your jaw your arm your back or the pain does not go away or it worsens or you feel any other odd symptoms you are to bypass everything I just said and go directly to the ER for cardiac workup.    If you wake up in the middle the night with chest pain you go straight to the ER that moment.

## 2024-07-29 NOTE — ED PROVIDER NOTES
Patient Seen in: [facility] Emergency Department      History     Chief Complaint   Patient presents with    Chest Pain Angina     Stated Complaint: chest pain    Subjective:   HPI    73-year-old male here with his significant other sent by his doctor's office as he has been experiencing on and off chest pain for a while with new exertional type symptoms that improved with rest.  No established cardiac history.  No fever.  Did travel recently out of town.  No history of blood clots.  He does not take his telmisartan regularly because of side effects and also does not take his antilipid/anticholesterol therapy because of muscle aches.    Objective:   Past Medical History:    Chronic idiopathic urticaria    Hyperlipidemia    Kidney stone complicating pregnancy, second trimester (HCC)    Kidney stones    Other and unspecified hyperlipidemia    Panic attacks    Tinnitus    10 years     Unspecified essential hypertension              Past Surgical History:   Procedure Laterality Date    Anesth,surgery of shoulder      right shoulder    Cataract      left eye    Correct bunion,simple      left foot                Social History     Socioeconomic History    Marital status:    Tobacco Use    Smoking status: Never     Passive exposure: Never    Smokeless tobacco: Never   Vaping Use    Vaping status: Never Used   Substance and Sexual Activity    Alcohol use: Yes     Alcohol/week: 3.0 - 4.0 standard drinks of alcohol     Types: 3 - 4 Standard drinks or equivalent per week     Comment: 2 times month    Drug use: No   Other Topics Concern    Caffeine Concern Yes     Comment: 10 cups coffee daily or ice tea or less    Exercise No   Social History Narrative    The patient does  use an assistive device.. Right wrist splint     The patient does live in a home with stairs.              Review of Systems    Positive for stated Chief Complaint: Chest Pain Angina    Other systems are as noted in HPI.  Constitutional and  vital signs reviewed.      All other systems reviewed and negative except as noted above.    Physical Exam     ED Triage Vitals [07/29/24 1415]   BP (!) 169/91   Pulse 77   Resp 18   Temp 98.2 °F (36.8 °C)   Temp src    SpO2 98 %   O2 Device None (Room air)       Current Vitals:   Vital Signs  BP: 156/74  Pulse: 62  Resp: 16  Temp: 98.2 °F (36.8 °C)  MAP (mmHg): 95    Oxygen Therapy  SpO2: 98 %  O2 Device: None (Room air)            Physical Exam    Constitutional: Oriented to person, place, and time.  Appears well-developed. No distress.   Head: Normocephalic and atraumatic.   Eyes: Conjunctivae are normal. Pupils are equal, round, and reactive to light.   Neck: Normal range of motion. Neck supple.   Cardiovascular: Normal rate, regular rhythm and intact and equal distal pulses.  No obvious murmur  Pulmonary/Chest: Effort normal. No respiratory distress.  Clear and equal breath sounds bilaterally  Abdominal: Soft. There is no tenderness. There is no guarding.   Musculoskeletal: Normal range of motion. No edema or tenderness.   Neurological: Alert and oriented to person, place, and time.  No gross focal deficits  Skin: Skin is warm and dry.   Psychiatric: Normal mood and affect.  Behavior is normal.   Nursing note and vitals reviewed.    Differential diagnosis includes acute chest pain, atypical ACS, atypical chest pain, anemia.      ED Course     Labs Reviewed   COMP METABOLIC PANEL (14) - Abnormal; Notable for the following components:       Result Value    Chloride 114 (*)     AST 40 (*)     All other components within normal limits   TROPONIN I HIGH SENSITIVITY - Normal   CBC WITH DIFFERENTIAL WITH PLATELET    Narrative:     The following orders were created for panel order CBC With Differential With Platelet.  Procedure                               Abnormality         Status                     ---------                               -----------         ------                     CBC W/ DIFFERENTIAL[[id number]]                               Final result                 Please view results for these tests on the individual orders.   CBC W/ DIFFERENTIAL     EKG    Rate, intervals and axes as noted on EKG Report.  Rate: 78  Rhythm: Sinus Rhythm  Reading: Right bundle branch block, no gross acute ischemic changes                 XR CHEST AP PORTABLE  (CPT=71045)    Result Date: 7/29/2024  PROCEDURE: XR CHEST AP PORTABLE  (CPT=71045) TIME: 1503  COMPARISON: [facility], XR CHEST AP PORTABLE (CPT=71045), 7/17/2019, 4:00 AM.  INDICATIONS: Chest pain on and off x couple years, progressively gotten worse the past two weeks.  TECHNIQUE:   Single view.   Findings and impression:  Normal heart size, clear lungs, normal pleura Finalized by (CST): Kain Briones MD on 7/29/2024 at 3:21 PM                  [facility]                                         Medical Decision Making  Patient stable here.  Hypertensive but remainder of workup including his troponin look good.  His x-ray is as above.  I did message his primary.  I spoke to the [facility] nurse rotation and they will call the patient tomorrow to arrange for close follow-up.  They do agree he does not need to stay.  Watch his blood pressure take his medications and low-dose aspirin until he sees a cardiologist    Problems Addressed:  Accelerated hypertension: acute illness or injury  Chest pain of uncertain etiology: acute illness or injury    Amount and/or Complexity of Data Reviewed  External Data Reviewed: ECG.     Details: August 1, 2022 EKG reviewed, there was not a right bundle branch block present at that time  Labs: ordered. Decision-making details documented in ED Course.  Radiology: ordered and independent interpretation performed. Decision-making details documented in ED Course.     Details: By my review there is no obvious evidence of pulmonary edema, pleural effusion, pneumothorax or focal infiltrate on x-ray imaging.  ECG/medicine tests: ordered and independent  interpretation performed. Decision-making details documented in ED Course.    Risk  OTC drugs.  Prescription drug management.  Decision regarding hospitalization.        Disposition and Plan     Clinical Impression:  1. Chest pain of uncertain etiology    2. Accelerated hypertension         Disposition:  Discharge  7/29/2024  4:44 pm    Follow-up:  Ry Bland MD  [address]  [facility] [address]  [phone]    Call      Oc Gonzalez MD  [address]  [facility] [id number] [phone]    Schedule an appointment as soon as possible for a visit      We recommend that you schedule follow up care with a primary care provider within the next three months to obtain basic health screening including reassessment of your blood pressure.      Medications Prescribed:  Discharge Medication List as of 7/29/2024  4:50 PM

## 2024-07-29 NOTE — PATIENT INSTRUCTIONS
Because you are having chest pain with activity that requires you to stop and is consistent with cardiac I would like for you to go to [facility] for workup and cardiology evaluation, including stress testing or PET scan depending on your ability to ambulate.  Please have [facility] on consult. - bridget            1.  I would like to start you on Lyrica i.e. pregabalin, this is a better form of gabapentin and should not cause many side effects and I would like for you to start with 50 mg nightly and a maximum dose is 100 to 200 mg 3 times a day but our first goal is to get you some relief at night and then see how the day goes.  I have many people who do just fine on 50 mg to 100 mg nightly.    2.  You may use the cyclobenzaprine as needed to help with breakthrough pain or spasms but if you prefer not to use it it is not needed.    3.  I would like for you to follow-up with the ear nose and throat physician for your ears, he will give you clearance    4.  I am going to draw some blood work on you today to evaluate for your symptoms, this is going to include a uric acid for gout, an inflammatory marker called C-reactive protein, rheumatoid arthritis creatinine kinase to check your muscles your kidney liver electrolytes along with your cholesterol.    Please stop your rosuvastatin for the next 1 to 2 weeks or until your symptoms resolve.  It is also possible the cholesterol medication is causing this.    5.  Ideally I would like to perform a cardiac workup however in those that are having symptoms we try to avoid treadmill stress testing due to the risk associated with physical activity and cardiac symptoms, your heart scan was only 49 last year which is very low therefore although your symptoms are consistent with some heart I am 50-50 given your previous evaluation was unremarkable.  Nonetheless I am going to have you see [facility] our cardiology service to have them discuss further with you in terms  of cardiac testing.    Although right now it does not appear to be your heart if at any point in time you have any sort of chest pain that radiates to your jaw your arm your back or the pain does not go away or it worsens or you feel any other odd symptoms you are to bypass everything I just said and go directly to the ER for cardiac workup.    If you wake up in the middle the night with chest pain you go straight to the ER that moment.

## 2024-07-29 NOTE — ED INITIAL ASSESSMENT (HPI)
Patient presents to ER from [facility] office for chest pain.   Patient admits pain has been intermittent x 3 weeks, patient admits pain brought on by activity.   Describes pain as if he was punched -- notes sore, and squeezing sensation.  Denies shortness of breath.

## 2024-07-29 NOTE — DISCHARGE INSTRUCTIONS
Monitor your blood pressure.  You really should take your medication as prescribed.  The cardiology office will contact you tomorrow morning to arrange for follow-up this week.  Take a baby aspirin, 81 mg, dose every day until follow-up with a cardiologist.

## 2024-07-30 ENCOUNTER — TELEPHONE (OUTPATIENT)
Facility: LOCATION | Age: 74
End: 2024-07-30

## 2024-07-30 LAB
ATRIAL RATE: 78 BPM
P AXIS: 56 DEGREES
P-R INTERVAL: 182 MS
Q-T INTERVAL: 424 MS
QRS DURATION: 142 MS
QTC CALCULATION (BEZET): 483 MS
R AXIS: -32 DEGREES
T AXIS: 42 DEGREES
VENTRICULAR RATE: 78 BPM

## 2024-07-30 NOTE — TELEPHONE ENCOUNTER
FW: Emergency Discharge  Received: Today  Ry Bland MD  P Em Rn Triage  Please forward/call [facility] for patient to be seen ASAP, was sent to ED with chest pains, ED discussed with godfrey HUGHES for discharge to home but needs [facility] follow up asap for further testing- this week please. If any issues please page me. Thanks.          Previous Messages    Admission Information    Current Information    Attending at Discharge Admitting Provider Admission Type Admission Status   Ru Escalante MD  Emergency Confirmed Discharge          Admission Date/Time Discharge Date/Time Hospital Service Auth/Cert Status   07/29/24  02:36 PM 07/29/24  04:58 PM Emergency Medicine Incomplete          Hospital Area Unit Room/Bed    [facility] EMERGENCY 42/42            Primary Diagnosis    Chest pain of uncertain etiology [R07.9]     Discharge Disposition Discharge Destination   Home or Self Care Home     ED Provider Notes  Ru Escalante MD (Physician)  Emergency Medicine  Expand All Collapse All     Patient Seen in: [facility] Emergency Department        History          Chief Complaint   Patient presents with    Chest Pain Angina      Stated Complaint: chest pain        Subjective:  HPI     73-year-old male here with his significant other sent by his doctor's office as he has been experiencing on and off chest pain for a while with new exertional type symptoms that improved with rest.  No established cardiac history.  No fever.  Did travel recently out of town.  No history of blood clots.  He does not take his telmisartan regularly because of side effects and also does not take his antilipid/anticholesterol therapy because of muscle aches.           Objective:      Past Medical History:    Chronic idiopathic urticaria    Hyperlipidemia    Kidney stone complicating pregnancy, second trimester (HCC)    Kidney stones    Other and unspecified hyperlipidemia    Panic attacks    Tinnitus     10 years     Unspecified  essential hypertension                         Past Surgical History:   Procedure Laterality Date    Anesth,surgery of shoulder         right shoulder    Cataract         left eye    Correct bunion,simple         left foot                      Social History            Socioeconomic History    Marital status:    Tobacco Use    Smoking status: Never       Passive exposure: Never    Smokeless tobacco: Never   Vaping Use    Vaping status: Never Used   Substance and Sexual Activity    Alcohol use: Yes       Alcohol/week: 3.0 - 4.0 standard drinks of alcohol       Types: 3 - 4 Standard drinks or equivalent per week       Comment: 2 times month    Drug use: No   Other Topics Concern    Caffeine Concern Yes       Comment: 10 cups coffee daily or ice tea or less    Exercise No   Social History Narrative     The patient does  use an assistive device.. Right wrist splint      The patient does live in a home with stairs.                   Review of Systems     Positive for stated Chief Complaint: Chest Pain Angina     Other systems are as noted in HPI.  Constitutional and vital signs reviewed.       All other systems reviewed and negative except as noted above.     Physical Exam          ED Triage Vitals [07/29/24 1415]   BP (!) 169/91   Pulse 77   Resp 18   Temp 98.2 °F (36.8 °C)   Temp src     SpO2 98 %   O2 Device None (Room air)         Current Vitals:   Vital Signs  BP: 156/74  Pulse: 62  Resp: 16  Temp: 98.2 °F (36.8 °C)  MAP (mmHg): 95     Oxygen Therapy  SpO2: 98 %  O2 Device: None (Room air)                 Physical Exam     Constitutional: Oriented to person, place, and time.  Appears well-developed. No distress.   Head: Normocephalic and atraumatic.   Eyes: Conjunctivae are normal. Pupils are equal, round, and reactive to light.   Neck: Normal range of motion. Neck supple.   Cardiovascular: Normal rate, regular rhythm and intact and equal distal pulses.  No obvious murmur  Pulmonary/Chest: Effort normal. No  respiratory distress.  Clear and equal breath sounds bilaterally  Abdominal: Soft. There is no tenderness. There is no guarding.   Musculoskeletal: Normal range of motion. No edema or tenderness.   Neurological: Alert and oriented to person, place, and time.  No gross focal deficits  Skin: Skin is warm and dry.   Psychiatric: Normal mood and affect.  Behavior is normal.   Nursing note and vitals reviewed.     Differential diagnosis includes acute chest pain, atypical ACS, atypical chest pain, anemia.        ED Course            Labs Reviewed   COMP METABOLIC PANEL (14) - Abnormal; Notable for the following components:       Result Value      Chloride 114 (*)       AST 40 (*)       All other components within normal limits   TROPONIN I HIGH SENSITIVITY - Normal   CBC WITH DIFFERENTIAL WITH PLATELET     Narrative:      The following orders were created for panel order CBC With Differential With Platelet.  Procedure                               Abnormality         Status                     ---------                               -----------         ------                     CBC W/ DIFFERENTIAL[[id number]]                              Final result                  Please view results for these tests on the individual orders.   CBC W/ DIFFERENTIAL      EKG     Rate, intervals and axes as noted on EKG Report.  Rate: 78  Rhythm: Sinus Rhythm  Reading: Right bundle branch block, no gross acute ischemic changes               XR CHEST AP PORTABLE  (CPT=71045)     Result Date: 7/29/2024  PROCEDURE:         XR CHEST AP PORTABLE  (CPT=71045) TIME:        1503  COMPARISON:      [facility], XR CHEST AP PORTABLE (CPT=71045), 7/17/2019, 4:00 AM.  INDICATIONS:    Chest pain on and off x couple years, progressively gotten worse the past two weeks.  TECHNIQUE:       Single view.   Findings and impression:  Normal heart size, clear lungs, normal pleura Finalized by (CST): Kain Briones MD on 7/29/2024 at 3:21 PM                         MDM                                 Medical Decision Making  Patient stable here.  Hypertensive but remainder of workup including his troponin look good.  His x-ray is as above.  I did message his primary.  I spoke to the [facility] nurse rotation and they will call the patient tomorrow to arrange for close follow-up.  They do agree he does not need to stay.  Watch his blood pressure take his medications and low-dose aspirin until he sees a cardiologist     Problems Addressed:  Accelerated hypertension: acute illness or injury  Chest pain of uncertain etiology: acute illness or injury     Amount and/or Complexity of Data Reviewed  External Data Reviewed: ECG.     Details: August 1, 2022 EKG reviewed, there was not a right bundle branch block present at that time  Labs: ordered. Decision-making details documented in ED Course.  Radiology: ordered and independent interpretation performed. Decision-making details documented in ED Course.     Details: By my review there is no obvious evidence of pulmonary edema, pleural effusion, pneumothorax or focal infiltrate on x-ray imaging.  ECG/medicine tests: ordered and independent interpretation performed. Decision-making details documented in ED Course.     Risk  OTC drugs.  Prescription drug management.  Decision regarding hospitalization.           Disposition and Plan      Clinical Impression:  1. Chest pain of uncertain etiology    2. Accelerated hypertension          Disposition:  Discharge  7/29/2024  4:44 pm     Follow-up:  Ry Bland MD  [address] [facility] [address]  [phone]     Call        Oc Gonzalez MD  [address]  [facility] [id number] [phone]     Schedule an appointment as soon as possible for a visit        We recommend that you schedule follow up care with a primary care provider within the next three months to obtain basic health screening including reassessment of your blood pressure.        Medications  Prescribed:  Discharge Medication List as of 7/29/2024  4:50 PM                                                           Other Notes    All notes        Additional Orders and Documentation      Results  Imaging         Meds           Orders           Flowsheets      Encounter Info: History,     Allergies,     Detailed Report     Communications    View All Conversations on this Encounter    Summary of care document sent to Oc Gonzalez MD  Sent 7/29/2024 by Ru Escalante MD  Media  From this encounter  Electronic signature on 7/29/2024 2:59 PM - E-signed    Clinical Impressions      Chest pain of uncertain etiology    Accelerated hypertension  Disposition      Discharge      ED/IC AVS (Printed 7/29/2024)      Follow-Ups    Call yR Bland MD (Internal Medicine)  Schedule an appointment with Oc Gonzalez MD (Interventional, Cardiology)  Medication Changes      None  Medication List at Discharge  Care Timeline    1403   Arrived   1411   EKG 12 Lead   1449   CBC With Differential With Platelet     Comp Metabolic Panel (14)      Troponin I (High Sensitivity)   1511   XR CHEST AP PORTABLE  (CPT=71045)   1658   Discharged        ED Provider Notes      7/29/2024  5:03 PM ED Provider Notes addendum by Ru Escalante MD     ED Arrival Information    Expected   -    Arrival   7/29/2024  2:03 PM    Acuity   Emergent          Means of arrival   Car    Escorted by   Spouse    Service   Emergency Medicine    Admission type   Emergency          Arrival complaint   chest pain        Admission    Complaint   None     Diagnoses     Codes Comments   Chest pain of uncertain etiology  - Primary R07.9    Accelerated hypertension I10             Treatment Team  Chat With All Treatment Team   Provider Role Specialty From To    Emily Gunn, RN  Registered Nurse Emergency Medicine 07

## 2024-07-30 NOTE — TELEPHONE ENCOUNTER
Spoke with Patient. Patient already has an appointment tomorrow with Dr. Carter at 8:40am at [facility].  Confirmed with [facility], Patient has an appointment tomorrow 7/31/24 at 8:40am

## 2024-07-31 ENCOUNTER — ORDER TRANSCRIPTION (OUTPATIENT)
Dept: ADMINISTRATIVE | Facility: HOSPITAL | Age: 74
End: 2024-07-31

## 2024-07-31 DIAGNOSIS — R94.31 ABNORMAL EKG: Primary | ICD-10-CM

## 2024-07-31 DIAGNOSIS — I25.9 CHRONIC ISCHEMIC HEART DISEASE: ICD-10-CM

## 2024-09-06 RX ORDER — METOPROLOL TARTRATE 25 MG/1
25 TABLET, FILM COATED ORAL AS DIRECTED
COMMUNITY
Start: 2024-07-31

## 2024-09-09 ENCOUNTER — HOSPITAL ENCOUNTER (OUTPATIENT)
Dept: CT IMAGING | Facility: HOSPITAL | Age: 74
Discharge: HOME | End: 2024-09-09
Attending: INTERNAL MEDICINE
Payer: MEDICARE

## 2024-09-09 VITALS — BODY MASS INDEX: 30.16 KG/M2 | WEIGHT: 199 LBS | HEIGHT: 68 IN

## 2024-09-09 DIAGNOSIS — R94.31 ABNORMAL EKG: ICD-10-CM

## 2024-09-09 DIAGNOSIS — I25.9 CHRONIC ISCHEMIC HEART DISEASE: ICD-10-CM

## 2024-09-09 LAB
CREAT BLD-MCNC: 0.7 MG/DL
EGFRCR SERPLBLD CKD-EPI 2021: 96 ML/MIN/1.73M2 (ref 60–?)

## 2024-09-09 PROCEDURE — 75574 CT ANGIO HRT W/3D IMAGE: CPT | Performed by: INTERNAL MEDICINE

## 2024-09-09 PROCEDURE — 82565 ASSAY OF CREATININE: CPT

## 2024-09-09 RX ORDER — METOPROLOL TARTRATE 25 MG/1
TABLET, FILM COATED ORAL
Status: COMPLETED
Start: 2024-09-09 | End: 2024-09-09

## 2024-09-09 RX ORDER — NITROGLYCERIN 0.4 MG/1
0.4 TABLET SUBLINGUAL ONCE
Status: DISCONTINUED | OUTPATIENT
Start: 2024-09-09 | End: 2024-09-11

## 2024-09-09 RX ORDER — METOPROLOL TARTRATE 25 MG/1
50 TABLET, FILM COATED ORAL ONCE AS NEEDED
Status: COMPLETED | OUTPATIENT
Start: 2024-09-09 | End: 2024-09-09

## 2024-09-09 RX ORDER — METOPROLOL TARTRATE 25 MG/1
50 TABLET, FILM COATED ORAL ONCE AS NEEDED
Status: DISCONTINUED | OUTPATIENT
Start: 2024-09-09 | End: 2024-09-11

## 2024-09-09 RX ORDER — METOPROLOL TARTRATE 1 MG/ML
5 INJECTION, SOLUTION INTRAVENOUS SEE ADMIN INSTRUCTIONS
Status: DISCONTINUED | OUTPATIENT
Start: 2024-09-09 | End: 2024-09-11

## 2024-09-09 RX ORDER — METOPROLOL TARTRATE 25 MG/1
100 TABLET, FILM COATED ORAL ONCE AS NEEDED
Status: DISCONTINUED | OUTPATIENT
Start: 2024-09-09 | End: 2024-09-11

## 2024-09-09 RX ORDER — ALPRAZOLAM 0.5 MG
0.5 TABLET ORAL AS NEEDED
COMMUNITY

## 2024-09-09 RX ORDER — DILTIAZEM HYDROCHLORIDE 5 MG/ML
5 INJECTION INTRAVENOUS SEE ADMIN INSTRUCTIONS
Status: DISCONTINUED | OUTPATIENT
Start: 2024-09-09 | End: 2024-09-11

## 2024-09-09 RX ORDER — METOPROLOL TARTRATE 25 MG/1
100 TABLET, FILM COATED ORAL ONCE AS NEEDED
Status: COMPLETED | OUTPATIENT
Start: 2024-09-09 | End: 2024-09-09

## 2024-09-09 RX ADMIN — METOPROLOL TARTRATE 50 MG: 25 TABLET, FILM COATED ORAL at 09:08:00

## 2024-09-09 NOTE — IMAGING NOTE
TO RAD HOLDING AT 0856      HX TAKEN: CHEST PAIN    PT CONSENTED AT 0908     BASELINE VITAL SIGNS: HR 65  /88 BMI 30.3    CTA ORDERED BY DR WEINSTEIN WAS PT GIVEN CTA PREMEDS: YES 25MG PO METOPROLOL X2 DOSES    METOPROLOL PO GIVEN 50 MILLIGRAMS AT 0908    18 GAUGE IV STARTED AT 0920 POC TESTING COMPLETED GFR = 86   CREATINE = 0.7    TO CT TABLE @ 0941    CONNECT TO MONITOR  HR 55 /82      NITROGLYCERIN 0.4 MILLIGRAMS SUBLINGUAL GIVEN AT 0942     CALCIUM SCORE COMPLETED AT 0946     INJECTION STARTED AT  0949 HR 49 DURING SCAN PROCEDURE COMPLETE    POST SCAN HR 55 /65 AT 0952    PT TO HOLDING AREA  VS HR 55 /75 Q 15 MIN X2     AVS  PROVIDED      1013 DISCHARGED HOME

## 2024-12-10 ENCOUNTER — LAB ENCOUNTER (OUTPATIENT)
Dept: LAB | Age: 74
End: 2024-12-10
Attending: INTERNAL MEDICINE
Payer: MEDICARE

## 2024-12-10 ENCOUNTER — OFFICE VISIT (OUTPATIENT)
Facility: LOCATION | Age: 74
End: 2024-12-10
Payer: MEDICARE

## 2024-12-10 ENCOUNTER — EKG ENCOUNTER (OUTPATIENT)
Dept: LAB | Age: 74
End: 2024-12-10
Attending: INTERNAL MEDICINE
Payer: MEDICARE

## 2024-12-10 DIAGNOSIS — Z13.228 SCREENING FOR ENDOCRINE, NUTRITIONAL, METABOLIC AND IMMUNITY DISORDER: ICD-10-CM

## 2024-12-10 DIAGNOSIS — Z13.21 SCREENING FOR ENDOCRINE, NUTRITIONAL, METABOLIC AND IMMUNITY DISORDER: ICD-10-CM

## 2024-12-10 DIAGNOSIS — H43.11 VITREOUS HEMORRHAGE OF RIGHT EYE (HCC): ICD-10-CM

## 2024-12-10 DIAGNOSIS — Z13.29 SCREENING FOR ENDOCRINE, NUTRITIONAL, METABOLIC AND IMMUNITY DISORDER: ICD-10-CM

## 2024-12-10 DIAGNOSIS — Z01.818 PRE-OPERATIVE EXAMINATION: Primary | ICD-10-CM

## 2024-12-10 DIAGNOSIS — I10 PRIMARY HYPERTENSION: ICD-10-CM

## 2024-12-10 DIAGNOSIS — Z01.818 PRE-OPERATIVE EXAMINATION: ICD-10-CM

## 2024-12-10 DIAGNOSIS — Z13.0 SCREENING FOR ENDOCRINE, NUTRITIONAL, METABOLIC AND IMMUNITY DISORDER: ICD-10-CM

## 2024-12-10 LAB
ALBUMIN SERPL-MCNC: 4.6 G/DL (ref 3.2–4.8)
ALBUMIN/GLOB SERPL: 2 {RATIO} (ref 1–2)
ALP LIVER SERPL-CCNC: 90 U/L
ALT SERPL-CCNC: 31 U/L
ANION GAP SERPL CALC-SCNC: 7 MMOL/L (ref 0–18)
AST SERPL-CCNC: 43 U/L (ref ?–34)
BASOPHILS # BLD AUTO: 0.06 X10(3) UL (ref 0–0.2)
BASOPHILS NFR BLD AUTO: 0.9 %
BILIRUB SERPL-MCNC: 0.7 MG/DL (ref 0.2–1.1)
BUN BLD-MCNC: 10 MG/DL (ref 9–23)
BUN/CREAT SERPL: 11.5 (ref 10–20)
CALCIUM BLD-MCNC: 10.1 MG/DL (ref 8.7–10.4)
CHLORIDE SERPL-SCNC: 110 MMOL/L (ref 98–112)
CO2 SERPL-SCNC: 27 MMOL/L (ref 21–32)
CREAT BLD-MCNC: 0.87 MG/DL
DEPRECATED RDW RBC AUTO: 44.5 FL (ref 35.1–46.3)
EGFRCR SERPLBLD CKD-EPI 2021: 90 ML/MIN/1.73M2 (ref 60–?)
EOSINOPHIL # BLD AUTO: 0.15 X10(3) UL (ref 0–0.7)
EOSINOPHIL NFR BLD AUTO: 2.4 %
ERYTHROCYTE [DISTWIDTH] IN BLOOD BY AUTOMATED COUNT: 14.4 % (ref 11–15)
EST. AVERAGE GLUCOSE BLD GHB EST-MCNC: 108 MG/DL (ref 68–126)
GLOBULIN PLAS-MCNC: 2.3 G/DL (ref 2–3.5)
GLUCOSE BLD-MCNC: 89 MG/DL (ref 70–99)
HBA1C MFR BLD: 5.4 % (ref ?–5.7)
HCT VFR BLD AUTO: 48.7 %
HGB BLD-MCNC: 15.6 G/DL
IMM GRANULOCYTES # BLD AUTO: 0.02 X10(3) UL (ref 0–1)
IMM GRANULOCYTES NFR BLD: 0.3 %
LYMPHOCYTES # BLD AUTO: 1.64 X10(3) UL (ref 1–4)
LYMPHOCYTES NFR BLD AUTO: 25.7 %
MCH RBC QN AUTO: 27.4 PG (ref 26–34)
MCHC RBC AUTO-ENTMCNC: 32 G/DL (ref 31–37)
MCV RBC AUTO: 85.6 FL
MONOCYTES # BLD AUTO: 0.42 X10(3) UL (ref 0.1–1)
MONOCYTES NFR BLD AUTO: 6.6 %
NEUTROPHILS # BLD AUTO: 4.09 X10 (3) UL (ref 1.5–7.7)
NEUTROPHILS # BLD AUTO: 4.09 X10(3) UL (ref 1.5–7.7)
NEUTROPHILS NFR BLD AUTO: 64.1 %
OSMOLALITY SERPL CALC.SUM OF ELEC: 297 MOSM/KG (ref 275–295)
PLATELET # BLD AUTO: 142 10(3)UL (ref 150–450)
POTASSIUM SERPL-SCNC: 4.5 MMOL/L (ref 3.5–5.1)
PROT SERPL-MCNC: 6.9 G/DL (ref 5.7–8.2)
RBC # BLD AUTO: 5.69 X10(6)UL
SODIUM SERPL-SCNC: 144 MMOL/L (ref 136–145)
WBC # BLD AUTO: 6.4 X10(3) UL (ref 4–11)

## 2024-12-10 PROCEDURE — 36415 COLL VENOUS BLD VENIPUNCTURE: CPT

## 2024-12-10 PROCEDURE — 80053 COMPREHEN METABOLIC PANEL: CPT

## 2024-12-10 PROCEDURE — 93010 ELECTROCARDIOGRAM REPORT: CPT | Performed by: INTERNAL MEDICINE

## 2024-12-10 PROCEDURE — 85025 COMPLETE CBC W/AUTO DIFF WBC: CPT

## 2024-12-10 PROCEDURE — 93005 ELECTROCARDIOGRAM TRACING: CPT

## 2024-12-10 PROCEDURE — 99215 OFFICE O/P EST HI 40 MIN: CPT | Performed by: INTERNAL MEDICINE

## 2024-12-10 PROCEDURE — 83036 HEMOGLOBIN GLYCOSYLATED A1C: CPT

## 2024-12-10 RX ORDER — TELMISARTAN 80 MG/1
80 TABLET ORAL NIGHTLY
Qty: 90 TABLET | Refills: 3 | Status: SHIPPED | OUTPATIENT
Start: 2024-12-10

## 2024-12-10 NOTE — PROGRESS NOTES
INTERNAL MEDICINE OFFICE NOTE     Patient ID: Daniel Quinonez Sr. is a 74 year old male.  Today's Date: 12/10/24  Chief Complaint: Pre-Op Exam    HPI                    Daniel Quinonez Sr. presents for preoperative clearance for: right eye fluid removal, s/p cataract surgery, vitrous hemorrhage.   Performing Physician: Dr Garrido  Date of surgery:  1/9/24  Elective or emergency: Elective  Pre-operative forms provided: None    #SURGICAL CLEARANCE: OKAY to proceed with surgical intervention if benefits outweigh risks at time of procedure. Patient instructed to follow all pre and post surgical advice. Surgeon and anesthesiology physicians to make final decision at time of procedure based upon patients clinical status.      Current complaints, if any:   Vision loss  Hypertension- bP uncontrolled despite telmisartan, discussed increasing dose.     Active medical problems:   Patient Active Problem List   Diagnosis    Cervical stenosis of spine    Chronic idiopathic urticaria    Lumbar radiculitis    Primary hypertension    Witnessed episode of apnea    Mixed hyperlipidemia    Foraminal stenosis due to intervertebral disc disease       Cervical/neck:   - Arthritis: Patient denies.   - Neck pain: Patient denies.   - Difficulty with ROM: some limited ROM.   - Prior neck injury/surgery: Patient denies.     Pulmonary:   - Smoker: Patient denies.   - Apnea/CPAP:Patient denies.   - Asthma/COPD: has LAISHA but not tolerating CPAP   - Difficulty laying flat for extended period of time: Patient denies.   - Dyspnea/exertional: Patient denies.   - Respiratory Medications: Patient denies.     Cardiac:  - History of ischemic coronary disease: minimal CAD, on medical therapy  - History of heart failure: Patient denies.   - Heart attack in past 90 days: Patient denies.   - Chest pain in last 90 days: Patient denies.   - History of Arrhythmia:  Patient denies.   - History of stroke or TIA:Patient denies.    - Diabetes/A1C:  Last A1c value was 5.4% done 12/10/2024.      - Anticoagulation/Warfarin/ASA/NOAC: rosuvastatin 10mg, no aspirin 81 due to GI upset.   - Echo/stress testing if available: see cardio notes- CT angio mild diesase LAD   CONCLUSION:      Soft plaque in the mid LAD with less than 30% stenosis.  Small focal calcified plaque proximal first diagonal branch without stenosis.  Remaining coronary arteries are normal without plaque or stenosis.  Coronary artery calcium score of 1.  Normal cardiac structures.     See the Radiologist report for over-read of non-vascular structures.     Onel Jc MD  9/10/2024  3:04 PM      Functional Capacity:   Perform ADLs- eating, dress, toilet (1 METs)? Yes, patient can perform.   Walk up flight of steps, hill, walk ground level 3-4mph (4 METs)? Yes, patient can perform.   Perform heavy housework- scrubbing floors, move heavy furniture, climb 2 flights of stairs (4-10 METs)? Yes, patient can perform.   Participate in strenuous sports- swimming, singles tennis, football, basketball, ski (+10 METs)? Yes, patient can perform.     Specialist clearances, if any:  Cardiology- stable    Pertinent Labs and Imaging reviewed:    Latest Reference Range & Units 12/10/24 12:06 12/10/24 12:16   EKG 12-LEAD  Rpt (P)    Ventricular rate BPM 75    Atrial rate BPM 75    P-R Interval ms 188    QRS Duration ms 138    Q-T Interval ms 446    QTC Calculation (Bezet) ms 498    P Axis degrees 53    R Axis degrees -36    T Axis degrees 32    Glucose 70 - 99 mg/dL  89   HEMOGLOBIN A1c <5.7 %  5.4   ESTIMATED AVERAGE GLUCOSE 68 - 126 mg/dL  108   Sodium 136 - 145 mmol/L  144   Potassium 3.5 - 5.1 mmol/L  4.5   Chloride 98 - 112 mmol/L  110   Carbon Dioxide, Total 21.0 - 32.0 mmol/L  27.0   BUN 9 - 23 mg/dL  10   CREATININE 0.70 - 1.30 mg/dL  0.87   CALCIUM 8.7 - 10.4 mg/dL  10.1   BUN/CREATININE RATIO 10.0 - 20.0   11.5   EGFR >=60 mL/min/1.73m2  90   ANION GAP 0 - 18 mmol/L  7   CALCULATED OSMOLALITY 275 - 295  mOsm/kg  297 (H)   ALKALINE PHOSPHATASE 45 - 117 U/L  90   AST (SGOT) <34 U/L  43 (H)   ALT (SGPT) 10 - 49 U/L  31   Total Bilirubin 0.2 - 1.1 mg/dL  0.7   Globulin 2.0 - 3.5 g/dL  2.3   A/G Ratio 1.0 - 2.0   2.0   PROTEIN, TOTAL 5.7 - 8.2 g/dL  6.9   Albumin 3.2 - 4.8 g/dL  4.6   Patient Fasting for CMP?   Patient not present   WBC 4.0 - 11.0 x10(3) uL  6.4   Hemoglobin 13.0 - 17.5 g/dL  15.6   Hematocrit 39.0 - 53.0 %  48.7   Platelet Count 150.0 - 450.0 10(3)uL  142.0 (L)   RBC 3.80 - 5.80 x10(6)uL  5.69   MCH 26.0 - 34.0 pg  27.4   MCHC 31.0 - 37.0 g/dL  32.0   MCV 80.0 - 100.0 fL  85.6   RDW 11.0 - 15.0 %  14.4   RDW-SD 35.1 - 46.3 fL  44.5   Prelim Neutrophil Abs 1.50 - 7.70 x10 (3) uL  4.09   Neutrophils Absolute 1.50 - 7.70 x10(3) uL  4.09   Lymphocytes Absolute 1.00 - 4.00 x10(3) uL  1.64   Monocytes Absolute 0.10 - 1.00 x10(3) uL  0.42   Eosinophils Absolute 0.00 - 0.70 x10(3) uL  0.15   Basophils Absolute 0.00 - 0.20 x10(3) uL  0.06   Immature Granulocyte Absolute 0.00 - 1.00 x10(3) uL  0.02   Neutrophils % %  64.1   Lymphocytes % %  25.7   Monocytes % %  6.6   Eosinophils % %  2.4   Basophils % %  0.9   Immature Granulocyte % %  0.3   (H): Data is abnormally high  (L): Data is abnormally low  (P): Preliminary  Rpt: View report in Results Review for more information    Vitals:    12/10/24 1106   BP: 134/83   Pulse: 79   SpO2: 98%   Weight: 204 lb 8 oz (92.8 kg)   Height: 5' 8\" (1.727 m)     body mass index is 31.09 kg/m².  BP Readings from Last 3 Encounters:   12/10/24 134/83   07/29/24 156/74   07/29/24 152/80     The 10-year ASCVD risk score (Neeraj FLETCHER, et al., 2019) is: 22.1%    Values used to calculate the score:      Age: 74 years      Sex: Male      Is Non- : Yes      Diabetic: No      Tobacco smoker: No      Systolic Blood Pressure: 134 mmHg      Is BP treated: Yes      HDL Cholesterol: 73 mg/dL      Total Cholesterol: 220 mg/dL  Medications reviewed:  Current Outpatient  Medications   Medication Sig Dispense Refill    telmisartan 80 MG Oral Tab Take 1 tablet (80 mg total) by mouth nightly. FOR BLOOD PRESSURE IF HOME  PRESSURES GREATER THAN 135/80 x 3 VALUES. 90 tablet 3    ALPRAZolam 0.5 MG Oral Tab Take 1 tablet (0.5 mg total) by mouth as needed for Anxiety.      rosuvastatin 10 MG Oral Tab Take 1 tablet (10 mg total) by mouth nightly. FOR CHOLESTEROL. 90 tablet 9    fluticasone-salmeterol (ADVAIR DISKUS) 250-50 MCG/ACT Inhalation Aerosol Powder, Breath Activated Inhale 1 puff into the lungs every 12 (twelve) hours. 3 each 3    Omalizumab (XOLAIR) 150 MG/ML Subcutaneous Solution Prefilled Syringe Inject 150 mg into the skin every 28 days. 1 mL 5         Assessment & Plan    1. Pre-operative examination (Primary)  -     EKG 12 Lead; Future; Expected date: 12/10/2024  -     Comp Metabolic Panel (14); Future; Expected date: 12/10/2024  -     CBC With Differential With Platelet; Future; Expected date: 12/10/2024  2. Vitreous hemorrhage of right eye (HCC)  3. Primary hypertension  -     Telmisartan; Take 1 tablet (80 mg total) by mouth nightly. FOR BLOOD PRESSURE IF HOME  PRESSURES GREATER THAN 135/80 x 3 VALUES.  Dispense: 90 tablet; Refill: 3  4. Screening for endocrine, nutritional, metabolic and immunity disorder  -     Hemoglobin A1C; Future; Expected date: 12/10/2024  Plan  Greater than 4 mets of activity.  He has no cardiac complaints and denies any cardiac history.  He has no pulmonary complaints denies any pulmonary history aside from sleep apnea but he does not use a CPAP machine.  Blood pressure is fairly well-controlled however I have asked him to increase his telmisartan to 80 mg if his blood pressure at home is elevated in preparation for surgery.  Labs and EKG are overall stable.    I spent 45 minutes obtaining pertitent medical history, reviewing pertinent imaging/labs and specialists notes, evaluating patient, discussing differential diagnosis' and various treatment  options, reinforcing importance of compliance with treatment plan, and completing documentation.       Follow Up: Return for send mychart with BP in 2 weeks, follow up 4 weeks video/office depending on BP control. ..         Objective: Results:   Physical Exam  Vitals and nursing note reviewed.   Constitutional:       General: He is not in acute distress.     Appearance: Normal appearance.   HENT:      Head: Normocephalic.      Right Ear: External ear normal.      Left Ear: External ear normal.   Eyes:      Extraocular Movements: Extraocular movements intact.      Conjunctiva/sclera: Conjunctivae normal.   Cardiovascular:      Rate and Rhythm: Normal rate and regular rhythm.      Pulses: Normal pulses.      Heart sounds: Normal heart sounds.   Pulmonary:      Effort: Pulmonary effort is normal. No respiratory distress.      Breath sounds: Normal breath sounds. No wheezing.   Abdominal:      General: Abdomen is flat. Bowel sounds are normal.      Tenderness: There is no abdominal tenderness.   Musculoskeletal:         General: Normal range of motion.      Cervical back: Normal range of motion and neck supple.   Skin:     Coloration: Skin is not jaundiced.   Neurological:      General: No focal deficit present.      Mental Status: He is alert and oriented to person, place, and time. Mental status is at baseline.   Psychiatric:         Mood and Affect: Mood normal.         Behavior: Behavior normal.        Reviewed:    Patient Active Problem List    Diagnosis    Foraminal stenosis due to intervertebral disc disease    Witnessed episode of apnea    Mixed hyperlipidemia    Lumbar radiculitis    Primary hypertension    Chronic idiopathic urticaria    Cervical stenosis of spine      Allergies[1]     Social History     Socioeconomic History    Marital status:    Tobacco Use    Smoking status: Never     Passive exposure: Never    Smokeless tobacco: Never   Vaping Use    Vaping status: Never Used   Substance and Sexual  Activity    Alcohol use: Yes     Alcohol/week: 3.0 - 4.0 standard drinks of alcohol     Types: 3 - 4 Standard drinks or equivalent per week     Comment: 2 times month    Drug use: No   Other Topics Concern    Caffeine Concern Yes     Comment: 10 cups coffee daily or ice tea or less    Exercise No   Social History Narrative    The patient does  use an assistive device.. Right wrist splint     The patient does live in a home with stairs.      Review of Systems  All other systems negative unless otherwise stated in ROS or HPI above.       Ry Bland MD  Internal Medicine       Call office with any questions or seek emergency care if necessary.   Patient understands and agrees to follow directions and advice.      ----------------------------------------- PATIENT INSTRUCTIONS-----------------------------------------   .    There are no Patient Instructions on file for this visit.             [1]   Allergies  Allergen Reactions    Alfuzosin SHORTNESS OF BREATH    Amlodipine HIVES    Hydrocodone-Acetaminophen OTHER (SEE COMMENTS)     Extreme upset stomach .

## 2024-12-11 VITALS
DIASTOLIC BLOOD PRESSURE: 83 MMHG | SYSTOLIC BLOOD PRESSURE: 134 MMHG | BODY MASS INDEX: 30.99 KG/M2 | HEART RATE: 79 BPM | WEIGHT: 204.5 LBS | HEIGHT: 68 IN | OXYGEN SATURATION: 98 %

## 2024-12-12 ENCOUNTER — TELEPHONE (OUTPATIENT)
Facility: LOCATION | Age: 74
End: 2024-12-12

## 2024-12-12 LAB
ATRIAL RATE: 75 BPM
P AXIS: 53 DEGREES
P-R INTERVAL: 188 MS
Q-T INTERVAL: 446 MS
QRS DURATION: 138 MS
QTC CALCULATION (BEZET): 498 MS
R AXIS: -36 DEGREES
T AXIS: 32 DEGREES
VENTRICULAR RATE: 75 BPM

## 2025-01-09 ENCOUNTER — OFFICE VISIT (OUTPATIENT)
Facility: LOCATION | Age: 75
End: 2025-01-09

## 2025-01-09 ENCOUNTER — LAB ENCOUNTER (OUTPATIENT)
Dept: LAB | Age: 75
End: 2025-01-09
Attending: INTERNAL MEDICINE
Payer: MEDICARE

## 2025-01-09 DIAGNOSIS — Z00.00 ANNUAL PHYSICAL EXAM: ICD-10-CM

## 2025-01-09 DIAGNOSIS — M51.9 FORAMINAL STENOSIS DUE TO INTERVERTEBRAL DISC DISEASE: ICD-10-CM

## 2025-01-09 DIAGNOSIS — Z13.228 SCREENING FOR ENDOCRINE, NUTRITIONAL, METABOLIC AND IMMUNITY DISORDER: ICD-10-CM

## 2025-01-09 DIAGNOSIS — Z12.5 ENCOUNTER FOR SCREENING FOR MALIGNANT NEOPLASM OF PROSTATE: ICD-10-CM

## 2025-01-09 DIAGNOSIS — H54.61 VISION LOSS OF RIGHT EYE: ICD-10-CM

## 2025-01-09 DIAGNOSIS — Z13.29 SCREENING FOR ENDOCRINE, NUTRITIONAL, METABOLIC AND IMMUNITY DISORDER: ICD-10-CM

## 2025-01-09 DIAGNOSIS — F41.0 PANIC ATTACK: ICD-10-CM

## 2025-01-09 DIAGNOSIS — H43.391 VITREOUS FLOATERS OF RIGHT EYE: ICD-10-CM

## 2025-01-09 DIAGNOSIS — M99.79 FORAMINAL STENOSIS DUE TO INTERVERTEBRAL DISC DISEASE: ICD-10-CM

## 2025-01-09 DIAGNOSIS — I10 PRIMARY HYPERTENSION: ICD-10-CM

## 2025-01-09 DIAGNOSIS — Z13.0 SCREENING FOR ENDOCRINE, NUTRITIONAL, METABOLIC AND IMMUNITY DISORDER: ICD-10-CM

## 2025-01-09 DIAGNOSIS — E55.9 VITAMIN D DEFICIENCY: ICD-10-CM

## 2025-01-09 DIAGNOSIS — Z12.11 SCREENING FOR COLON CANCER: ICD-10-CM

## 2025-01-09 DIAGNOSIS — Z00.00 ENCOUNTER FOR ANNUAL HEALTH EXAMINATION: Primary | ICD-10-CM

## 2025-01-09 DIAGNOSIS — E78.2 MIXED HYPERLIPIDEMIA: ICD-10-CM

## 2025-01-09 DIAGNOSIS — M48.02 CERVICAL STENOSIS OF SPINE: ICD-10-CM

## 2025-01-09 DIAGNOSIS — L50.1 CHRONIC IDIOPATHIC URTICARIA: ICD-10-CM

## 2025-01-09 DIAGNOSIS — Z13.21 SCREENING FOR ENDOCRINE, NUTRITIONAL, METABOLIC AND IMMUNITY DISORDER: ICD-10-CM

## 2025-01-09 LAB
ALBUMIN SERPL-MCNC: 4.7 G/DL (ref 3.2–4.8)
ALBUMIN/GLOB SERPL: 2.1 {RATIO} (ref 1–2)
ALP LIVER SERPL-CCNC: 102 U/L
ALT SERPL-CCNC: 23 U/L
ANION GAP SERPL CALC-SCNC: 9 MMOL/L (ref 0–18)
AST SERPL-CCNC: 26 U/L (ref ?–34)
BILIRUB SERPL-MCNC: 0.8 MG/DL (ref 0.2–1.1)
BUN BLD-MCNC: 8 MG/DL (ref 9–23)
BUN/CREAT SERPL: 9.3 (ref 10–20)
CALCIUM BLD-MCNC: 10.3 MG/DL (ref 8.7–10.4)
CHLORIDE SERPL-SCNC: 109 MMOL/L (ref 98–112)
CHOLEST SERPL-MCNC: 198 MG/DL (ref ?–200)
CO2 SERPL-SCNC: 24 MMOL/L (ref 21–32)
COMPLEXED PSA SERPL-MCNC: 0.72 NG/ML (ref ?–4)
CREAT BLD-MCNC: 0.86 MG/DL
DEPRECATED RDW RBC AUTO: 44.6 FL (ref 35.1–46.3)
EGFRCR SERPLBLD CKD-EPI 2021: 90 ML/MIN/1.73M2 (ref 60–?)
ERYTHROCYTE [DISTWIDTH] IN BLOOD BY AUTOMATED COUNT: 14 % (ref 11–15)
EST. AVERAGE GLUCOSE BLD GHB EST-MCNC: 108 MG/DL (ref 68–126)
GLOBULIN PLAS-MCNC: 2.2 G/DL (ref 2–3.5)
GLUCOSE BLD-MCNC: 86 MG/DL (ref 70–99)
HBA1C MFR BLD: 5.4 % (ref ?–5.7)
HCT VFR BLD AUTO: 51.1 %
HDLC SERPL-MCNC: 76 MG/DL (ref 40–59)
HGB BLD-MCNC: 15.8 G/DL
LDLC SERPL CALC-MCNC: 110 MG/DL (ref ?–100)
MCH RBC QN AUTO: 26.6 PG (ref 26–34)
MCHC RBC AUTO-ENTMCNC: 30.9 G/DL (ref 31–37)
MCV RBC AUTO: 86.2 FL
NONHDLC SERPL-MCNC: 122 MG/DL (ref ?–130)
OSMOLALITY SERPL CALC.SUM OF ELEC: 292 MOSM/KG (ref 275–295)
PLATELET # BLD AUTO: 138 10(3)UL (ref 150–450)
POTASSIUM SERPL-SCNC: 4 MMOL/L (ref 3.5–5.1)
PROT SERPL-MCNC: 6.9 G/DL (ref 5.7–8.2)
RBC # BLD AUTO: 5.93 X10(6)UL
SODIUM SERPL-SCNC: 142 MMOL/L (ref 136–145)
TRIGL SERPL-MCNC: 64 MG/DL (ref 30–149)
TSI SER-ACNC: 1.36 UIU/ML (ref 0.55–4.78)
VIT D+METAB SERPL-MCNC: 53 NG/ML (ref 30–100)
VLDLC SERPL CALC-MCNC: 11 MG/DL (ref 0–30)
WBC # BLD AUTO: 7.8 X10(3) UL (ref 4–11)

## 2025-01-09 PROCEDURE — 84443 ASSAY THYROID STIM HORMONE: CPT | Performed by: INTERNAL MEDICINE

## 2025-01-09 PROCEDURE — 85027 COMPLETE CBC AUTOMATED: CPT | Performed by: INTERNAL MEDICINE

## 2025-01-09 PROCEDURE — 36415 COLL VENOUS BLD VENIPUNCTURE: CPT | Performed by: INTERNAL MEDICINE

## 2025-01-09 PROCEDURE — 82306 VITAMIN D 25 HYDROXY: CPT | Performed by: INTERNAL MEDICINE

## 2025-01-09 PROCEDURE — 80053 COMPREHEN METABOLIC PANEL: CPT | Performed by: INTERNAL MEDICINE

## 2025-01-09 PROCEDURE — 83036 HEMOGLOBIN GLYCOSYLATED A1C: CPT | Performed by: INTERNAL MEDICINE

## 2025-01-09 PROCEDURE — 80061 LIPID PANEL: CPT | Performed by: INTERNAL MEDICINE

## 2025-01-09 RX ORDER — ALPRAZOLAM 0.5 MG
0.5 TABLET ORAL AS NEEDED
Qty: 30 TABLET | Refills: 3 | Status: SHIPPED | OUTPATIENT
Start: 2025-01-09

## 2025-01-09 RX ORDER — ROSUVASTATIN CALCIUM 10 MG/1
10 TABLET, COATED ORAL NIGHTLY
Qty: 90 TABLET | Refills: 9 | Status: SHIPPED | OUTPATIENT
Start: 2025-01-09

## 2025-01-09 RX ORDER — TELMISARTAN 80 MG/1
80 TABLET ORAL NIGHTLY
Qty: 90 TABLET | Refills: 9 | Status: SHIPPED | OUTPATIENT
Start: 2025-01-09

## 2025-01-09 RX ORDER — HYDROCHLOROTHIAZIDE 25 MG/1
25 TABLET ORAL DAILY
Qty: 90 TABLET | Refills: 9 | Status: SHIPPED | OUTPATIENT
Start: 2025-01-09

## 2025-01-09 NOTE — PROGRESS NOTES
Subjective:   Daniel Quinonez Sr. is a 74 year old male who presents for a Medicare Subsequent Annual Wellness visit (Pt already had Initial Annual Wellness) and scheduled follow up of multiple significant but stable problems.   continues to have loss of right vision, left eye now bothersome. Had some issue with opthal. Patient wants second opiniion. [facility] called and patient given appt.     History/Other:   Fall Risk Assessment:   He has been screened for Falls and is low risk.      Cognitive Assessment:   He had a completely normal cognitive assessment - see flowsheet entries       Functional Ability/Status:   Daniel Quinonez Sr. has a completely normal functional assessment. See flowsheet for details.      Depression Screening (PHQ):  PHQ-2 SCORE: 0  , done 1/12/2025             Advanced Directives:   He does NOT have a Living Will. [ ]  He has a Power of  for Health Care on file in [employer]..  Discussed Advance Care Planning with patient (and family/surrogate if present). Standard forms made available to patient in After Visit Summary.      Patient Active Problem List   Diagnosis    Cervical stenosis of spine    Chronic idiopathic urticaria    Primary hypertension    Mixed hyperlipidemia    Foraminal stenosis due to intervertebral disc disease    Vitreous floaters of right eye     Allergies:  He is allergic to alfuzosin, amlodipine, and hydrocodone-acetaminophen.    Current Medications:  Outpatient Medications Marked as Taking for the 1/9/25 encounter (Office Visit) with Ry Bland MD   Medication Sig    telmisartan 80 MG Oral Tab Take 1 tablet (80 mg total) by mouth nightly. FOR BLOOD PRESSURE.    hydroCHLOROthiazide 25 MG Oral Tab Take 1 tablet (25 mg total) by mouth daily. FOR BLOOD PRESSURE.    rosuvastatin 10 MG Oral Tab Take 1 tablet (10 mg total) by mouth nightly. FOR CHOLESTEROL.    ALPRAZolam 0.5 MG Oral Tab Take 1 tablet (0.5 mg total) by mouth as needed for Anxiety.     fluticasone-salmeterol (ADVAIR DISKUS) 250-50 MCG/ACT Inhalation Aerosol Powder, Breath Activated Inhale 1 puff into the lungs every 12 (twelve) hours.    Omalizumab (XOLAIR) 150 MG/ML Subcutaneous Solution Prefilled Syringe Inject 150 mg into the skin every 28 days.       Medical History:  He  has a past medical history of Chronic idiopathic urticaria, Hyperlipidemia, Kidney stone complicating pregnancy, second trimester (HCC) (02/2021), Kidney stones, Other and unspecified hyperlipidemia, Panic attacks, Tinnitus, and Unspecified essential hypertension.  Surgical History:  He  has a past surgical history that includes correct bunion,simple; anesth,surgery of shoulder; and cataract.   Family History:  His family history includes Cancer in his brother, father, and sister; Dementia in an other family member; Diabetes in his mother.  Social History:  He  reports that he has never smoked. He has never been exposed to tobacco smoke. He has never used smokeless tobacco. He reports current alcohol use of about 3.0 - 4.0 standard drinks of alcohol per week. He reports that he does not use drugs.    Tobacco:  He has never smoked tobacco.    CAGE Alcohol Screen:   CAGE screening score of 0 on 1/12/2025, showing low risk of alcohol abuse.      Patient Care Team:  Ry Bland MD as PCP - General (Internal Medicine)  Devang Hoyos MD (NEUROLOGY)  Srini Bazan MD as Consulting Physician (Physical Medicine)  Billy Gillespie MD (NEUROLOGY)    Review of Systems   Constitutional:  Negative for unexpected weight change.   HENT:  Negative for hearing loss.    Eyes:  Negative for pain and visual disturbance.   Respiratory:  Negative for shortness of breath.    Cardiovascular:  Negative for chest pain, palpitations and leg swelling.   Gastrointestinal:  Negative for abdominal pain and blood in stool.   Genitourinary:  Negative for difficulty urinating and hematuria.   Neurological:  Negative for tremors and syncope.    Psychiatric/Behavioral: Negative.            Objective:   Physical Exam  Vitals and nursing note reviewed.   Constitutional:       General: He is not in acute distress.     Appearance: Normal appearance.   HENT:      Head: Normocephalic.      Right Ear: External ear normal.      Left Ear: External ear normal.   Eyes:      Extraocular Movements: Extraocular movements intact.      Conjunctiva/sclera: Conjunctivae normal.   Pulmonary:      Effort: Pulmonary effort is normal.   Musculoskeletal:         General: Normal range of motion.      Cervical back: Normal range of motion and neck supple.   Skin:     Coloration: Skin is not jaundiced.   Neurological:      General: No focal deficit present.      Mental Status: He is alert and oriented to person, place, and time. Mental status is at baseline.   Psychiatric:         Mood and Affect: Mood normal.         Behavior: Behavior normal.         /76   Pulse 79   Ht 5' 8\" (1.727 m)   Wt 208 lb (94.3 kg)   SpO2 98%   BMI 31.63 kg/m²  Estimated body mass index is 31.63 kg/m² as calculated from the following:    Height as of this encounter: 5' 8\" (1.727 m).    Weight as of this encounter: 208 lb (94.3 kg).    Medicare Hearing Assessment:   Hearing Screening    Screening Method: Whisper Test  Whisper Test Result: Pass         Assessment & Plan:   Daniel Quinonez Sr. is a 74 year old male who presents for a Medicare Assessment.     1. Encounter for annual health examination (Primary)  Plan  As above    2. Vision loss of right eye  -     Ophthalmology Referral - In Network  Plan  [place] [facility] contacted, patient to be seen asap.     3. Vitreous floaters of right eye  -     Ophthalmology Referral - In Network  Plan  [facility] [facility] contacted, patient to be seen asap.     4. Primary hypertension  -     Telmisartan; Take 1 tablet (80 mg total) by mouth nightly. FOR BLOOD PRESSURE.  Dispense: 90 tablet; Refill: 9  -     hydroCHLOROthiazide; Take 1 tablet (25 mg  total) by mouth daily. FOR BLOOD PRESSURE.  Dispense: 90 tablet; Refill: 9  Plan  Chronic, well controlled on current medications as noted in medications, denies major adverse effects, continue with present management, continue to monitor labs.     5. Mixed hyperlipidemia  -     Rosuvastatin Calcium; Take 1 tablet (10 mg total) by mouth nightly. FOR CHOLESTEROL.  Dispense: 90 tablet; Refill: 9  Plan  Chronic, well controlled on current medications as noted in medications, denies major adverse effects, continue with present management, continue to monitor labs.     6. Panic attack  -     ALPRAZolam; Take 1 tablet (0.5 mg total) by mouth as needed for Anxiety.  Dispense: 30 tablet; Refill: 3  Plan  Chronic, well controlled on current medications as noted in medications, denies major adverse effects, continue with present management, continue to monitor labs.     7. Screening for endocrine, nutritional, metabolic and immunity disorder  -     Comp Metabolic Panel (14)  -     Hemoglobin A1C  -     CBC, Platelet; No Differential  -     Lipid Panel  -     TSH W Reflex To Free T4  -     Vitamin D  Plan  As above    8. Annual physical exam  -     Comp Metabolic Panel (14)  -     Hemoglobin A1C  -     CBC, Platelet; No Differential  -     Lipid Panel  -     TSH W Reflex To Free T4  Plan  As above    9. Vitamin D deficiency  -     Vitamin D  Plan  As above    10. Encounter for screening for malignant neoplasm of prostate  -     PSA Total, Screen  Plan  As above    11. Screening for colon cancer  -     Occult Blood, Fecal, FIT Immunoassay; Future; Expected date: 01/09/2025  -     [facility] Telephone Colon Screen; Future; Expected date: 01/16/2025  -     Referral to [facility] ([facility] docs) for Colonoscopy **Select a Doc to add to AVS**  -     Referral to [facility] for Colonoscopy **Select a Doc to add to AVS**  Plan  As above    12. Foraminal stenosis due to intervertebral disc disease  Plan  Chronic, well  controlled on current medications as noted in medications, denies major adverse effects, continue with present management, continue to monitor labs.     13. Cervical stenosis of spine  Plan  Chronic, well controlled on current medications as noted in medications, denies major adverse effects, continue with present management, continue to monitor labs.     14. Chronic idiopathic urticaria  Plan  Chronic, well controlled on current medications as noted in medications, denies major adverse effects, continue with present management, continue to monitor labs.       The patient indicates understanding of these issues and agrees to the plan.  Consult ordered.  Continue with current treatment plan.  Further testing ordered.  Imaging studies ordered.  Lab work ordered.  Patient reassured.  Prescription medication ordered.  Reinforced healthy diet, lifestyle, and exercise.      No follow-ups on file.     Ry Bland MD, 1/9/2025     Supplementary Documentation:   General Health:       Health Maintenance   Topic Date Due    Colorectal Cancer Screening  Never done    Zoster Vaccines (1 of 2) Never done    PSA  12/09/2022    COVID-19 Vaccine (6 - 2024-25 season) 09/01/2024    Influenza Vaccine (1) Never done    Annual Physical  10/23/2024    Annual Depression Screening  01/01/2025    Fall Risk Screening (Annual)  01/01/2025    Pneumococcal Vaccine: 50+ Years  Completed    Meningococcal B Vaccine  Aged Out

## 2025-01-11 NOTE — TELEPHONE ENCOUNTER
LMTCB on patient's voicemail. Below information was NOT left on patient's voicemail. Please report below information when patient returns call.      Also, patient was reminded on voicemail that he is past due for his f/u appointment for [facility] as he is r Patient back from CT, hooked up to monitor. Denies any needs at this time.

## 2025-01-12 VITALS
HEART RATE: 79 BPM | WEIGHT: 208 LBS | SYSTOLIC BLOOD PRESSURE: 134 MMHG | OXYGEN SATURATION: 98 % | BODY MASS INDEX: 31.52 KG/M2 | DIASTOLIC BLOOD PRESSURE: 76 MMHG | HEIGHT: 68 IN

## 2025-01-12 DIAGNOSIS — E78.2 MIXED HYPERLIPIDEMIA: Primary | ICD-10-CM

## 2025-01-12 PROBLEM — R06.81 WITNESSED EPISODE OF APNEA: Status: RESOLVED | Noted: 2023-10-23 | Resolved: 2025-01-12

## 2025-01-12 PROBLEM — M54.16 LUMBAR RADICULITIS: Status: RESOLVED | Noted: 2023-09-19 | Resolved: 2025-01-12

## 2025-01-12 PROBLEM — H43.391 VITREOUS FLOATERS OF RIGHT EYE: Status: ACTIVE | Noted: 2025-01-12

## 2025-01-13 ENCOUNTER — LAB ENCOUNTER (OUTPATIENT)
Dept: LAB | Age: 75
End: 2025-01-13
Attending: INTERNAL MEDICINE
Payer: MEDICARE

## 2025-03-11 ENCOUNTER — TELEPHONE (OUTPATIENT)
Facility: LOCATION | Age: 75
End: 2025-03-11

## 2025-03-11 NOTE — TELEPHONE ENCOUNTER
Please relay to patient or Listed contact if unable to reach:  Srinivasan Quinonez Sr.   Dr. Bland is unfortunately no longer with our organization,  My name is Dr. James and I'm located in [facility] and assigned to his former patients thru [place].   I am reaching out in regards to remind you that  you are due forHypertension follow up and Establish care visit with New Physician    Please complete this within the next month as Dr. James values providing high value evidence based medical care and prevention and would like to go over the next steps needed In your wellness endeavor.     However if you have established care with another provider please call our office [phone] or send us a [employer] message and we will remove your name from our list to indicate you have a new provider and will gladly send records to them if requested.      Wish you all the best in your endeavor for better health  -Dr. Wing James MD, MPH

## 2025-03-12 ENCOUNTER — TELEPHONE (OUTPATIENT)
Facility: LOCATION | Age: 75
End: 2025-03-12

## 2025-05-29 ENCOUNTER — OFFICE VISIT (OUTPATIENT)
Facility: LOCATION | Age: 75
End: 2025-05-29

## 2025-05-29 VITALS — SYSTOLIC BLOOD PRESSURE: 115 MMHG | HEART RATE: 75 BPM | DIASTOLIC BLOOD PRESSURE: 71 MMHG

## 2025-05-29 DIAGNOSIS — M79.644 THUMB PAIN, RIGHT: ICD-10-CM

## 2025-05-29 DIAGNOSIS — I10 PRIMARY HYPERTENSION: ICD-10-CM

## 2025-05-29 DIAGNOSIS — J32.9 CHRONIC RHINOSINUSITIS: ICD-10-CM

## 2025-05-29 DIAGNOSIS — F41.0 PANIC ATTACK: ICD-10-CM

## 2025-05-29 DIAGNOSIS — J45.41 MODERATE PERSISTENT ASTHMA WITH ACUTE EXACERBATION (HCC): ICD-10-CM

## 2025-05-29 DIAGNOSIS — M25.551 RIGHT HIP PAIN: Primary | ICD-10-CM

## 2025-05-29 PROCEDURE — 99215 OFFICE O/P EST HI 40 MIN: CPT | Performed by: STUDENT IN AN ORGANIZED HEALTH CARE EDUCATION/TRAINING PROGRAM

## 2025-05-29 RX ORDER — TURMERIC 100 %
POWDER (GRAM) MISCELLANEOUS AS DIRECTED
COMMUNITY

## 2025-05-29 RX ORDER — UBIDECARENONE 100 MG
CAPSULE ORAL AS DIRECTED
COMMUNITY

## 2025-05-29 RX ORDER — MELOXICAM 15 MG/1
15 TABLET ORAL DAILY
Qty: 90 TABLET | Refills: 1 | Status: SHIPPED | OUTPATIENT
Start: 2025-05-29

## 2025-05-29 RX ORDER — AZELASTINE HYDROCHLORIDE 137 UG/1
1-2 SPRAY, METERED NASAL 2 TIMES DAILY
Qty: 30 ML | Refills: 11 | Status: SHIPPED | OUTPATIENT
Start: 2025-05-29

## 2025-05-29 RX ORDER — TELMISARTAN 80 MG/1
80 TABLET ORAL NIGHTLY
Qty: 90 TABLET | Refills: 3 | Status: SHIPPED | OUTPATIENT
Start: 2025-05-29

## 2025-05-29 RX ORDER — ALPRAZOLAM 0.5 MG
0.5 TABLET ORAL AS NEEDED
Qty: 60 TABLET | Refills: 0 | Status: SHIPPED | OUTPATIENT
Start: 2025-05-29

## 2025-05-29 RX ORDER — FLUTICASONE PROPIONATE 50 MCG
2 SPRAY, SUSPENSION (ML) NASAL DAILY
Qty: 1 EACH | Refills: 11 | Status: SHIPPED | OUTPATIENT
Start: 2025-05-29

## 2025-05-29 RX ORDER — HYDROCORTISONE ACETATE 0.5 %
CREAM (GRAM) TOPICAL AS DIRECTED
COMMUNITY

## 2025-05-29 NOTE — PROGRESS NOTES
OFFICE NOTE       The following individual(s) verbally consented to be recorded using ambient AI listening technology and understand that they can each withdraw their consent to this listening technology at any point by asking the clinician to turn off or pause the recording:    Patient name: Daniel Quinonez Sr.  Additional names:            Patient ID: Daniel Quinonez Sr. is a 74 year old male.  Today's Date: 05/29/25  Chief Complaint: Hip Pain (R hip pain level 2/10) and Hand Pain (R thumb pain, hx of carpel tunnel)    History of Present Illness  Daniel Quinonez Sr. is a 74 year old male with hypertension and chronic ischemic heart disease who presents with right hip and hand pain.    He has experienced right hip pain and right hand pain for at least eight years. The hip pain is mild, rated at two out of ten, and he has previously received cortisone injections for relief. His previous doctor retired, and he currently manages the pain with four Advil a day, starting with one in the morning and taking more as needed. An x-ray of the right hip in 2023 showed mild osteoarthritis. He also has moderate osteoarthritis of the lower back and spine.    He has a history of carpal tunnel syndrome affecting his right hand and has been advised against frequent cortisone shots for hand pain. He experiences nausea from the smell of topical treatments like Bengay.    He takes telmisartan 80 mg and hydrochlorothiazide for hypertension. He experiences sinus issues potentially related to telmisartan and uses an over-the-counter saline spray by [employer] for relief. He avoids nasal sprays due to concerns about habit formation.    He takes alprazolam as needed for anxiety, breaking the tablet in half, and reports that 30 pills last him about a year. He has a fear of confinement, which contributes to his anxiety.    He has a history of chronic rhinosinusitis and underwent a septoplasty, which was painful and only  temporarily effective. He uses saline spray for sinus relief and avoids Afrin due to its potential for habit formation.    No current use of an inhaler and no muscle spasms. He experiences sinus issues and has a sensitive sense of smell.       Vitals:    05/29/25 1039   BP: 115/71   Pulse: 75     body mass index is unknown because there is no height or weight on file.  BP Readings from Last 3 Encounters:   05/29/25 115/71   01/09/25 134/76   12/10/24 134/83     The 10-year ASCVD risk score (Neeraj FLETCHER, et al., 2019) is: 16.3%    Values used to calculate the score:      Age: 74 years      Sex: Male      Is Non- : Yes      Diabetic: No      Tobacco smoker: No      Systolic Blood Pressure: 115 mmHg      Is BP treated: Yes      HDL Cholesterol: 76 mg/dL      Total Cholesterol: 198 mg/dL  Results  RADIOLOGY  Right hip X-ray: Mild osteoarthritis (2023)  Lumbar spine X-ray: Moderate osteoarthritis (2023)       Medications reviewed:  Current Medications[1]      Assessment & Plan    1. Right hip pain (Primary)  -     XR HIP + PELVIS MIN 4 VIEWS RIGHT (CPT=73503); Future; Expected date: 05/29/2025  -     Ortho Referral - In Network  -     PHYSICAL THERAPY - INTERNAL  -     Meloxicam; Take 1 tablet (15 mg total) by mouth daily.  Dispense: 90 tablet; Refill: 1  2. Thumb pain, right  -     XR HAND (MIN 3 VIEWS), RIGHT (CPT=73130); Future; Expected date: 05/29/2025  -     Ortho Referral - In Network  -     PHYSICAL THERAPY - INTERNAL  -     Ortho Referral - In Network  -     Meloxicam; Take 1 tablet (15 mg total) by mouth daily.  Dispense: 90 tablet; Refill: 1  3. Moderate persistent asthma with acute exacerbation (HCC)  4. Chronic rhinosinusitis  -     Azelastine HCl; 1-2 sprays by Nasal route in the morning and 1-2 sprays before bedtime. FOR SINUS SYMPTOMS/NASAL CONGESTION.  Dispense: 30 mL; Refill: 11  -     Fluticasone Propionate; 2 sprays by Each Nare route daily. FOR NASAL CONGESTION/SINUS SYMPTOMS.   Dispense: 1 each; Refill: 11  5. Primary hypertension  -     Telmisartan; Take 1 tablet (80 mg total) by mouth nightly. FOR BLOOD PRESSURE.  Dispense: 90 tablet; Refill: 3  6. Panic attack  -     ALPRAZolam; Take 1 tablet (0.5 mg total) by mouth as needed for Anxiety. Will need appointment for refills (call/set up once down to last 7 pills)-Dr. James  Dispense: 60 tablet; Refill: 0    Assessment & Plan  Right hip and hand pain  Chronic right hip pain with mild osteoarthritis and carpal tunnel syndrome in the right hand. Previous cortisone injections in the hip. Discussed meloxicam as a stronger anti-inflammatory option with lower liver risk. Avoided muscle relaxers and topical analgesics.  - Order x-ray of the right hip and hand.  - Refer to orthopedic specialist Dr. Betty Aviles for hip evaluation.  - Refer to orthopedic specialist Dr. Matthieu Bland for hand evaluation.  - Start physical therapy for hip and hand pain.  - Prescribe meloxicam for pain management.    Chronic rhinosinusitis  Chronic rhinosinusitis with nasal congestion. Discussed Flonase and Xylazine as non-habit forming nasal sprays. Provided nasal spray technique instructions.  - Prescribe Flonase nasal spray.  - Prescribe Xylazine nasal spray.  - Instruct on proper nasal spray technique.    Hypertension  Hypertension well-controlled with current medication. Sinus issues potentially related to telmisartan. Discussed seasonal allergies and non-habit forming nasal sprays.  - Continue hydrochlorothiazide with 90-day supply and 3 refills.    Chronic ischemic heart disease  Managed by cardiology.    Anxiety  Anxiety managed with alprazolam as needed. Discussed risks of long-term benzodiazepine use and alternative management strategies.  - Prescribe 60 pills of alprazolam with instructions to call for refill when down to 7 pills.       Follow Up: As needed/if symptoms worsen or No follow-ups on file..     I spent 47 minutes obtaining [facility]  history, reviewing pertinent imaging/labs and specialists notes, evaluating patient, discussing differential diagnosis' and various treatment options, reinforcing importance of compliance with treatment plan, and completing documentation.     Encounter Times  PreChartin minutes    Reviewing/Obtainin minutes      Medical Exam: 5 minutes    Plan: 6 minutes      Notes: 5 minutes    Counseling/Education: 7 minutes      Referring/Communicating:   minutes    Ind Interpretation:   minutes      Care Coordination:   minutes       My total time spent caring for the patient on the day of the encounter: 47 minutes.         Objective/ Results:   Physical Exam  Constitutional:       Appearance: He is well-developed.   Cardiovascular:      Rate and Rhythm: Normal rate and regular rhythm.      Heart sounds: Normal heart sounds.   Pulmonary:      Effort: Pulmonary effort is normal.      Breath sounds: Normal breath sounds.   Abdominal:      General: Bowel sounds are normal.      Palpations: Abdomen is soft.   Musculoskeletal:         General: Swelling and tenderness present.      Right hand: Tenderness (base of right thumb) present.      Right hip: Tenderness present. Decreased range of motion.   Skin:     General: Skin is warm and dry.   Neurological:      Mental Status: He is alert and oriented to person, place, and time.      Deep Tendon Reflexes: Reflexes are normal and symmetric.        Physical Exam  HEENT: Nasal mucosa inflamed but not severe.     Reviewed:    Patient Active Problem List    Diagnosis    Vitreous floaters of right eye    Foraminal stenosis due to intervertebral disc disease    Mixed hyperlipidemia    Primary hypertension    Chronic idiopathic urticaria    Cervical stenosis of spine      Allergies[2]   Short Social Hx on File[3]   Review of Systems   Constitutional: Negative.    Respiratory: Negative.     Cardiovascular: Negative.    Gastrointestinal: Negative.    Musculoskeletal:  Positive for  arthralgias and joint swelling. Negative for neck pain and neck stiffness.   Skin: Negative.    Neurological: Negative.        All other systems negative unless otherwise stated in ROS or HPI above.       Wing James MD  Internal Medicine       Call office with any questions or seek emergency care if necessary.   Patient understands and agrees to follow directions and advice.      ----------------------------------------- PATIENT INSTRUCTIONS-----------------------------------------     There are no Patient Instructions on file for this visit.        The 21st Century Cures Act makes medical notes available to patients in the interest of transparency.  However, please be advised that this is a medical document.  It is intended as a peer to peer communication.  It is written in medical language and may contain abbreviations or verbiage that are technical and unfamiliar.  It may appear blunt or direct.  Medical documents are intended to carry relevant information, facts as evident, and the clinical opinion of the practitioner.          [1]   Current Outpatient Medications   Medication Sig Dispense Refill    VANADYL SULFATE OR Taking for daibetes      Zinc Sulfate 66 MG Oral Tab Take 1 tablet by mouth daily.      MAGNESIUM OR Take 250 mg by mouth As Directed.      Glucosamine-Chondroit-Vit C-Mn (GLUCOSAMINE 1500 COMPLEX) Oral Cap Take by mouth As Directed.      Emollient (COLLAGEN EX) Take 6,000 mg by mouth.      cyanocobalamin 250 MCG Oral Tab Take 1 tablet (250 mcg total) by mouth daily.      Coenzyme Q10 100 MG Oral Cap Take by mouth As Directed.      Turmeric Does not apply Powder Take by mouth As Directed.      Meloxicam 15 MG Oral Tab Take 1 tablet (15 mg total) by mouth daily. 90 tablet 1    azelastine 137 MCG/SPRAY Nasal Solution 1-2 sprays by Nasal route in the morning and 1-2 sprays before bedtime. FOR SINUS SYMPTOMS/NASAL CONGESTION. 30 mL 11    fluticasone propionate 50 MCG/ACT Nasal Suspension 2 sprays by  Each Nare route daily. FOR NASAL CONGESTION/SINUS SYMPTOMS. 1 each 11    telmisartan 80 MG Oral Tab Take 1 tablet (80 mg total) by mouth nightly. FOR BLOOD PRESSURE. 90 tablet 3    ALPRAZolam 0.5 MG Oral Tab Take 1 tablet (0.5 mg total) by mouth as needed for Anxiety. Will need appointment for refills (call/set up once down to last 7 pills)-Dr. James 60 tablet 0    hydroCHLOROthiazide 25 MG Oral Tab Take 1 tablet (25 mg total) by mouth daily. FOR BLOOD PRESSURE. 90 tablet 9    rosuvastatin 10 MG Oral Tab Take 1 tablet (10 mg total) by mouth nightly. FOR CHOLESTEROL. 90 tablet 9    fluticasone-salmeterol (ADVAIR DISKUS) 250-50 MCG/ACT Inhalation Aerosol Powder, Breath Activated Inhale 1 puff into the lungs every 12 (twelve) hours. 3 each 3    Omalizumab (XOLAIR) 150 MG/ML Subcutaneous Solution Prefilled Syringe Inject 150 mg into the skin every 28 days. (Patient not taking: Reported on 5/29/2025) 1 mL 5   [2]   Allergies  Allergen Reactions    Alfuzosin SHORTNESS OF BREATH    Amlodipine HIVES    Hydrocodone-Acetaminophen OTHER (SEE COMMENTS)     Extreme upset stomach .    Oxycodone UNKNOWN     Perhaps hydrocodone as well; reaction = hives   [3]   Social History  Socioeconomic History    Marital status:    Tobacco Use    Smoking status: Never     Passive exposure: Never    Smokeless tobacco: Never   Vaping Use    Vaping status: Never Used   Substance and Sexual Activity    Alcohol use: Yes     Alcohol/week: 3.0 - 4.0 standard drinks of alcohol     Types: 3 - 4 Standard drinks or equivalent per week     Comment: 2 times month    Drug use: No   Other Topics Concern    Caffeine Concern Yes     Comment: 10 cups coffee daily or ice tea or less    Exercise No   Social History Narrative    The patient does  use an assistive device.. Right wrist splint     The patient does live in a home with stairs.

## 2025-06-02 ENCOUNTER — HOSPITAL ENCOUNTER (OUTPATIENT)
Dept: GENERAL RADIOLOGY | Age: 75
Discharge: HOME | End: 2025-06-02
Attending: STUDENT IN AN ORGANIZED HEALTH CARE EDUCATION/TRAINING PROGRAM
Payer: MEDICARE

## 2025-06-02 DIAGNOSIS — M79.644 THUMB PAIN, RIGHT: ICD-10-CM

## 2025-06-02 DIAGNOSIS — M25.551 RIGHT HIP PAIN: ICD-10-CM

## 2025-06-02 PROCEDURE — 73130 X-RAY EXAM OF HAND: CPT | Performed by: STUDENT IN AN ORGANIZED HEALTH CARE EDUCATION/TRAINING PROGRAM

## 2025-06-02 PROCEDURE — 73502 X-RAY EXAM HIP UNI 2-3 VIEWS: CPT | Performed by: STUDENT IN AN ORGANIZED HEALTH CARE EDUCATION/TRAINING PROGRAM

## 2025-06-06 ENCOUNTER — PATIENT MESSAGE (OUTPATIENT)
Dept: INTERNAL MEDICINE CLINIC | Facility: CLINIC | Age: 75
End: 2025-06-06

## 2025-06-06 DIAGNOSIS — M25.551 BILATERAL HIP PAIN: ICD-10-CM

## 2025-06-06 DIAGNOSIS — M25.552 BILATERAL HIP PAIN: ICD-10-CM

## 2025-06-06 DIAGNOSIS — M25.551 RIGHT HIP PAIN: Primary | ICD-10-CM

## 2025-06-06 DIAGNOSIS — M79.641 RIGHT HAND PAIN: ICD-10-CM

## 2025-06-06 NOTE — PROGRESS NOTES
Please relay to patient if not read: (duplicate result on hip and hand xray):      Srinivasan Mr Quinonez,   Your xray of the hand and hip show the following:    Right hand xray: moderate arthritis of hands/wrist  Xray of hip: Mild arthritis of left hip and moderate of right hip    Please follow up with physical therapy and then orthopedic surgeon for further evaluation  -Dr James

## 2025-06-10 NOTE — TELEPHONE ENCOUNTER
Spoke with patient (identified name and ), results reviewed.    Patient does not want to proceed with PT \"it only works for the pain while I am participating and then once I stop my pain comes back\"  Patient is more interested in pain management with physiatry.  Referral pended for review.

## 2025-06-16 ENCOUNTER — TELEPHONE (OUTPATIENT)
Dept: INTERNAL MEDICINE CLINIC | Facility: CLINIC | Age: 75
End: 2025-06-16

## 2025-06-16 NOTE — TELEPHONE ENCOUNTER
Received form from [place] [facility] patient having surgery on 7/7/25. Right Vitrectomy    Called pt and left voice message to call back and schedule PRE OP clearance appt with Dr James as soon as possible .   Please call patient and assist with scheduling

## 2025-06-17 NOTE — TELEPHONE ENCOUNTER
Patient returned call. Verified name and date of birth.  Patient informed of Talat's message.  Verbalized understanding.  Patient states he is in the parking lot of [place] office-to drop off form from [facility].  Appointment scheduled for pre op.  30 mins slot scheduled for pre op. Please advise if ok.    Future Appointments   Date Time Provider Department Center   6/18/2025 11:30 AM Wing James MD [facility]

## 2025-06-18 ENCOUNTER — LAB ENCOUNTER (OUTPATIENT)
Dept: LAB | Age: 75
End: 2025-06-18
Attending: STUDENT IN AN ORGANIZED HEALTH CARE EDUCATION/TRAINING PROGRAM
Payer: MEDICARE

## 2025-06-18 ENCOUNTER — OFFICE VISIT (OUTPATIENT)
Dept: INTERNAL MEDICINE CLINIC | Facility: CLINIC | Age: 75
End: 2025-06-18

## 2025-06-18 VITALS
HEART RATE: 80 BPM | HEIGHT: 68 IN | BODY MASS INDEX: 31.37 KG/M2 | WEIGHT: 207 LBS | SYSTOLIC BLOOD PRESSURE: 134 MMHG | DIASTOLIC BLOOD PRESSURE: 79 MMHG

## 2025-06-18 DIAGNOSIS — Z01.818 PRE-OP EVALUATION: ICD-10-CM

## 2025-06-18 DIAGNOSIS — Z01.818 PRE-OP EVALUATION: Primary | ICD-10-CM

## 2025-06-18 LAB
ALBUMIN SERPL-MCNC: 4.7 G/DL (ref 3.2–4.8)
ALBUMIN/GLOB SERPL: 2.4 {RATIO} (ref 1–2)
ALP LIVER SERPL-CCNC: 91 U/L (ref 45–117)
ALT SERPL-CCNC: 20 U/L (ref 10–49)
ANION GAP SERPL CALC-SCNC: 8 MMOL/L (ref 0–18)
AST SERPL-CCNC: 30 U/L (ref ?–34)
ATRIAL RATE: 67 BPM
BASOPHILS # BLD AUTO: 0.04 X10(3) UL (ref 0–0.2)
BASOPHILS NFR BLD AUTO: 0.7 %
BILIRUB SERPL-MCNC: 1 MG/DL (ref 0.2–1.1)
BUN BLD-MCNC: 13 MG/DL (ref 9–23)
BUN/CREAT SERPL: 13.4 (ref 10–20)
CALCIUM BLD-MCNC: 10.3 MG/DL (ref 8.7–10.4)
CHLORIDE SERPL-SCNC: 108 MMOL/L (ref 98–112)
CO2 SERPL-SCNC: 27 MMOL/L (ref 21–32)
CREAT BLD-MCNC: 0.97 MG/DL (ref 0.7–1.3)
DEPRECATED RDW RBC AUTO: 45.1 FL (ref 35.1–46.3)
EGFRCR SERPLBLD CKD-EPI 2021: 81 ML/MIN/1.73M2 (ref 60–?)
EOSINOPHIL # BLD AUTO: 0.15 X10(3) UL (ref 0–0.7)
EOSINOPHIL NFR BLD AUTO: 2.5 %
ERYTHROCYTE [DISTWIDTH] IN BLOOD BY AUTOMATED COUNT: 14.1 % (ref 11–15)
FASTING STATUS PATIENT QL REPORTED: NO
GLOBULIN PLAS-MCNC: 2 G/DL (ref 2–3.5)
GLUCOSE BLD-MCNC: 95 MG/DL (ref 70–99)
HCT VFR BLD AUTO: 49.9 % (ref 39–53)
HGB BLD-MCNC: 15.6 G/DL (ref 13–17.5)
IMM GRANULOCYTES # BLD AUTO: 0.03 X10(3) UL (ref 0–1)
IMM GRANULOCYTES NFR BLD: 0.5 %
LYMPHOCYTES # BLD AUTO: 1.76 X10(3) UL (ref 1–4)
LYMPHOCYTES NFR BLD AUTO: 29.5 %
MCH RBC QN AUTO: 27.3 PG (ref 26–34)
MCHC RBC AUTO-ENTMCNC: 31.3 G/DL (ref 31–37)
MCV RBC AUTO: 87.4 FL (ref 80–100)
MONOCYTES # BLD AUTO: 0.4 X10(3) UL (ref 0.1–1)
MONOCYTES NFR BLD AUTO: 6.7 %
NEUTROPHILS # BLD AUTO: 3.58 X10 (3) UL (ref 1.5–7.7)
NEUTROPHILS # BLD AUTO: 3.58 X10(3) UL (ref 1.5–7.7)
NEUTROPHILS NFR BLD AUTO: 60.1 %
OSMOLALITY SERPL CALC.SUM OF ELEC: 296 MOSM/KG (ref 275–295)
P AXIS: 50 DEGREES
P-R INTERVAL: 178 MS
PLATELET # BLD AUTO: 173 10(3)UL (ref 150–450)
POTASSIUM SERPL-SCNC: 4.4 MMOL/L (ref 3.5–5.1)
PROT SERPL-MCNC: 6.7 G/DL (ref 5.7–8.2)
Q-T INTERVAL: 420 MS
QRS DURATION: 90 MS
QTC CALCULATION (BEZET): 443 MS
R AXIS: -12 DEGREES
RBC # BLD AUTO: 5.71 X10(6)UL (ref 3.8–5.8)
SODIUM SERPL-SCNC: 143 MMOL/L (ref 136–145)
T AXIS: 44 DEGREES
VENTRICULAR RATE: 67 BPM
WBC # BLD AUTO: 6 X10(3) UL (ref 4–11)

## 2025-06-18 PROCEDURE — 93005 ELECTROCARDIOGRAM TRACING: CPT

## 2025-06-18 PROCEDURE — 93010 ELECTROCARDIOGRAM REPORT: CPT | Performed by: INTERNAL MEDICINE

## 2025-06-18 PROCEDURE — 85025 COMPLETE CBC W/AUTO DIFF WBC: CPT

## 2025-06-18 PROCEDURE — 80053 COMPREHEN METABOLIC PANEL: CPT

## 2025-06-18 PROCEDURE — 36415 COLL VENOUS BLD VENIPUNCTURE: CPT

## 2025-06-18 NOTE — PROGRESS NOTES
OFFICE NOTE       The following individual(s) verbally consented to be recorded using ambient AI listening technology and understand that they can each withdraw their consent to this listening technology at any point by asking the clinician to turn off or pause the recording:    Patient name: Daniel Quinonez Sr.  Additional names:            Patient ID: Daniel Quinonez Sr. is a 74 year old male.  Today's Date: 06/18/25  Chief Complaint: Pre-Op Exam (Sx: 7/7/25 Right Vitrectomy Dr Boo fax: [phone]) and Cholesterol      History of Present Illness  Daniel Quinonez Sr. is a 74 year old male who presents for preoperative evaluation for an upcoming right vitrectomy.    He is scheduled for a right vitrectomy on July 7, 2025, following a previous preoperative visit last fall or winter, which was postponed due to unspecified issues.    He has a history of mild coronary artery disease with a soft plaque in the mid LAD with less than 30% stenosis and a small focal calcified plaque in the proximal diagonal branch. His calcium score is 1. 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 No recent chest pain, heart disease, heart failure, or heart attacks.    He experiences chronic neck pain but denies any history of neck surgeries or arthritis. He consistently experiences pain somewhere in his body. Right hip pain prevents him from lying flat for prolonged periods, but positioning with a pillow under his knees alleviates the discomfort. He is active, attending a health club regularly, using a bicycle and leg press machine to manage his hip pain.    He experiences nasal congestion and a squeezing sensation as side effects from his medication, Tummy sorting, which resolves when he stops taking it. He does not take blood thinners, does not smoke, and has sleep apnea but does not use a CPAP machine due to discomfort.    He had an asthma attack years ago  related to his work environment but has not used his inhaler, Advair, in three years, except once. He carries it when traveling. No shortness of breath with short distances.                       Daniel Quinonez Sr. presents for preoperative clearance for: Right Vitrectomy  Performing Physician: Dr Boo fax: [phone])  Date of surgery: July 7th 2025  Elective or emergency: Elective  Pre-operative forms provided: yes        Active medical problems:   Problem List[1]      Cervical/neck:   - Arthritis: No  - Neck pain: Yes  - Difficulty with ROM: No  - Prior neck injury/surgery: No    Cardiac:  - History of ischemic coronary disease: No  - History of heart failure: No  - Heart attack in past 90 days: No  - Chest pain in last 90 days: No  - History of Arrhythmia:  Yes; comment: Right Bundle Branch block and left atriofascicular block  - History of stroke or TIA:   (in past 6-9months?)No  - Diabetes/A1C: Last A1c value was 5.4% done 1/9/2025.      - Anticoagulation/Warfarin/ASA/NOAC: No  - Echo if available:  - Stress test if available:     Pulmonary:   - Smoker: No  - Apnea/CPAP: Yes, but not on CPAP  - Asthma/COPD:Yes; comment: Asthma (has  it once past 3 years).   - Difficulty laying flat for extended period of time: Yes; comment: due to right hip pain.   - Dyspnea/exertional: No  - Respiratory Medications: Yes; comment: As needed (rarely use    Functional Capacity:   Perform ADLs- eating, dress, toilet (1 METs)? Yes, patient can perform.   Walk up flight of steps, hill, walk ground level 3-4mph (4 METs)? Yes, patient can perform.   Perform heavy housework- scrubbing floors, move heavy furniture, climb 2 flights of stairs (4-10 METs)? Yes; comment: 2 flgihts of stairs and work out leg press and bicycle   Participate in strenuous sports- swimming, singles tennis, football, basketball, ski (+10 METs)?  No      Vitals:    06/18/25 1132   BP: 134/79   Pulse: 80   Weight: 207 lb (93.9 kg)   Height: 5' 8\" (1.727 m)      body mass index is 31.47 kg/m².  BP Readings from Last 3 Encounters:   06/18/25 134/79   05/29/25 115/71   01/09/25 134/76     The 10-year ASCVD risk score (Neeraj FLETCHER, et al., 2019) is: 21.3%    Values used to calculate the score:      Age: 74 years      Sex: Male      Is Non- : Yes      Diabetic: No      Tobacco smoker: No      Systolic Blood Pressure: 134 mmHg      Is BP treated: Yes      HDL Cholesterol: 76 mg/dL      Total Cholesterol: 198 mg/dL  Results  RADIOLOGY  Calcium score: 1 (09/2024)    DIAGNOSTIC  EKG: Right bundle branch block, left anterior fascicular block (12/10/2024)       Medications reviewed:  Current Medications[2]      Assessment & Plan    1. Pre-op evaluation (Primary)  -     Comp Metabolic Panel (14); Future; Expected date: 06/18/2025  -     CBC With Differential With Platelet; Future; Expected date: 06/18/2025  -     EKG 12 Lead; Future; Expected date: 06/18/2025    Assessment & Plan  Preoperative Evaluation for Right Vitrectomy  Stable for surgery with no significant cardiac issues. Right bundle branch block and left anterior fascicular block present but not contraindications. Good functional capacity.  EKG with SR and fusion complex (often benign)  CBC and CMP stable.  - Send preoperative clearance to ophthalmologist.    Coronary Artery Disease  Mild disease with less than 30% stenosis in mid LAD and minimal calcification. Well-managed cardiac status with no recent symptoms.    Sleep Apnea  Intolerance to CPAP therapy. Anesthesia team informed of oxygen requirements.  - Ensure anesthesia team is aware of sleep apnea and oxygen requirements.    Asthma  No recent exacerbations. Rare inhaler use.  - Ensure availability of inhaler during surgery.    Right Hip Pain  Chronic pain affecting ability to lay flat. Positioning with pillow under knees provides relief.  - Ensure proper positioning during surgery to alleviate hip pain.       Follow Up: As needed/if symptoms  worsen or No follow-ups on file..     #Revised Cardiac Risk Index   Revised Cardiac Risk Index for Pre-Operative Risk from [employer]  on 2025  ** All calculations should be rechecked by clinician prior to use **    RESULT SUMMARY:  1 points  RCRI Score    6.0 %  Risk of major cardiac event      INPUTS:  High-risk surgery --> 0 = No  History of ischemic heart disease --> 1 = Yes  History of congestive heart failure --> 0 = No  History of cerebrovascular disease --> 0 = No  Pre-operative treatment with insulin --> 0 = No  Pre-operative creatinine >2 mg/dL / 176.8 µmol/L --> 0 = No    Low risk     Dr Boo  to proceed with surgical intervention if benefits outweigh risks at time of procedure. Patient instructed to follow all pre and post surgical advice. Surgeon and anesthesiology physicians to make final decision at time of procedure based upon patients clinical status.       I spent 53 minutes obtaining pertitent medical history, reviewing pertinent imaging/labs and specialists notes, evaluating patient, discussing differential diagnosis' and various treatment options, reinforcing importance of compliance with treatment plan, and completing documentation.     Encounter Times  PreChartin minutes    Reviewing/Obtainin minutes      Medical Exam: 3 minutes    Plan: 5 minutes      Notes: 4 minutes    Counseling/Education: 7 minutes      Referring/Communicatin minutes    Ind Interpretation: 1 minutes      Care Coordination: 3 minutes       My total time spent caring for the patient on the day of the encounter: 53 minutes.         Objective/ Results:   Physical Exam  Constitutional:       Appearance: He is well-developed.   Cardiovascular:      Rate and Rhythm: Normal rate and regular rhythm.      Heart sounds: Normal heart sounds.   Pulmonary:      Effort: Pulmonary effort is normal.      Breath sounds: Normal breath sounds.   Abdominal:      General: Bowel sounds are normal.      Palpations: Abdomen  is soft.   Skin:     General: Skin is warm and dry.   Neurological:      Mental Status: He is alert and oriented to person, place, and time.      Deep Tendon Reflexes: Reflexes are normal and symmetric.        Physical Exam  CARDIOVASCULAR: Heart regular rate and rhythm.     Reviewed:    Patient Active Problem List    Diagnosis    Vitreous floaters of right eye    Foraminal stenosis due to intervertebral disc disease    Mixed hyperlipidemia    Primary hypertension    Chronic idiopathic urticaria    Cervical stenosis of spine      Allergies[3]   Short Social Hx on File[4]   Review of Systems   Constitutional: Negative.    Eyes:  Positive for visual disturbance.   Respiratory: Negative.     Cardiovascular: Negative.    Gastrointestinal: Negative.    Skin: Negative.    Neurological: Negative.        All other systems negative unless otherwise stated in ROS or HPI above.       Wing James MD  Internal Medicine       Call office with any questions or seek emergency care if necessary.   Patient understands and agrees to follow directions and advice.      ----------------------------------------- PATIENT INSTRUCTIONS-----------------------------------------     There are no Patient Instructions on file for this visit.        The 21st Century Cures Act makes medical notes available to patients in the interest of transparency.  However, please be advised that this is a medical document.  It is intended as a peer to peer communication.  It is written in medical language and may contain abbreviations or verbiage that are technical and unfamiliar.  It may appear blunt or direct.  Medical documents are intended to carry relevant information, facts as evident, and the clinical opinion of the practitioner.          [1]   Patient Active Problem List  Diagnosis    Cervical stenosis of spine    Chronic idiopathic urticaria    Primary hypertension    Mixed hyperlipidemia    Foraminal stenosis due to intervertebral disc disease     Vitreous floaters of right eye   [2]   Current Outpatient Medications   Medication Sig Dispense Refill    VANADYL SULFATE OR Taking for daibetes      Zinc Sulfate 66 MG Oral Tab Take 1 tablet by mouth daily.      MAGNESIUM OR Take 250 mg by mouth As Directed.      Glucosamine-Chondroit-Vit C-Mn (GLUCOSAMINE 1500 COMPLEX) Oral Cap Take by mouth As Directed.      Emollient (COLLAGEN EX) Take 6,000 mg by mouth.      cyanocobalamin 250 MCG Oral Tab Take 1 tablet (250 mcg total) by mouth daily.      Coenzyme Q10 100 MG Oral Cap Take by mouth As Directed.      Turmeric Does not apply Powder Take by mouth As Directed.      Meloxicam 15 MG Oral Tab Take 1 tablet (15 mg total) by mouth daily. 90 tablet 1    azelastine 137 MCG/SPRAY Nasal Solution 1-2 sprays by Nasal route in the morning and 1-2 sprays before bedtime. FOR SINUS SYMPTOMS/NASAL CONGESTION. 30 mL 11    fluticasone propionate 50 MCG/ACT Nasal Suspension 2 sprays by Each Nare route daily. FOR NASAL CONGESTION/SINUS SYMPTOMS. 1 each 11    telmisartan 80 MG Oral Tab Take 1 tablet (80 mg total) by mouth nightly. FOR BLOOD PRESSURE. 90 tablet 3    ALPRAZolam 0.5 MG Oral Tab Take 1 tablet (0.5 mg total) by mouth as needed for Anxiety. Will need appointment for refills (call/set up once down to last 7 pills)-Dr. James 60 tablet 0    hydroCHLOROthiazide 25 MG Oral Tab Take 1 tablet (25 mg total) by mouth daily. FOR BLOOD PRESSURE. 90 tablet 9    rosuvastatin 10 MG Oral Tab Take 1 tablet (10 mg total) by mouth nightly. FOR CHOLESTEROL. 90 tablet 9    fluticasone-salmeterol (ADVAIR DISKUS) 250-50 MCG/ACT Inhalation Aerosol Powder, Breath Activated Inhale 1 puff into the lungs every 12 (twelve) hours. 3 each 3   [3]   Allergies  Allergen Reactions    Alfuzosin SHORTNESS OF BREATH    Amlodipine HIVES    Hydrocodone-Acetaminophen OTHER (SEE COMMENTS)     Extreme upset stomach .    Oxycodone UNKNOWN     Perhaps hydrocodone as well; reaction = hives   [4]   Social  History  Socioeconomic History    Marital status:    Tobacco Use    Smoking status: Never     Passive exposure: Never    Smokeless tobacco: Never   Vaping Use    Vaping status: Never Used   Substance and Sexual Activity    Alcohol use: Yes     Alcohol/week: 3.0 - 4.0 standard drinks of alcohol     Types: 3 - 4 Standard drinks or equivalent per week     Comment: 2 times month    Drug use: No   Other Topics Concern    Caffeine Concern Yes     Comment: 10 cups coffee daily or ice tea or less    Exercise No   Social History Narrative    The patient does  use an assistive device.. Right wrist splint     The patient does live in a home with stairs.

## 2025-06-19 ENCOUNTER — PATIENT MESSAGE (OUTPATIENT)
Dept: INTERNAL MEDICINE CLINIC | Facility: CLINIC | Age: 75
End: 2025-06-19

## 2025-06-25 NOTE — TELEPHONE ENCOUNTER
Patient returned our call ( Identified name and date of birth )    Explained to the patient Dr Erika Najera message    Dr Erika Becerra here, your EKG shows Sinus with fusion complex a combination of heart beats from atria and ventricles, often are benign, just make sure your blood pressure is below 130/80    Patient states he does not check his b/p and the Telmisartan \" makes me sick\"     Routing as For Your Information

## 2025-06-26 NOTE — TELEPHONE ENCOUNTER
Spoke to patient (verified Name and ) and relayed provider's message below. Patient verbalized understanding. States he has been taking the medication 3 times a week. He will continue to monitor his blood pressure, and now knows that it has to be below 130/80. No further questions or concerns at this time.

## 2025-06-26 NOTE — TELEPHONE ENCOUNTER
DR James ===please clarify if the patient needs to stop the telmisartan now ? He takes it as needed only . He is aware that it should not be taken as NEEDED .     Please respond directly to the patient if no additional staff support is required.      Patient calling back (verified name and Date Of Birth ).   Notified Dr James's recommendation below.     Per patient, HE IS STILL TAKING THE TELMISARTAN as needed only if his blood pressure is high, he took it an hour ago.     Before exercise BP =130-140/67-82  After exercise Vs=877/79       Reported the following ;  Telmisartan   causes severe nasal congestion .    Amlodipine causes cough and hives.  He only use the 2 nasal spray once .   Surgery date ==7/7/2025.         No future appointments.

## 2025-06-26 NOTE — TELEPHONE ENCOUNTER
Left voice message to call office back.  Office phone number provided with office telephone hours.  Sent MyChart message as well  First attempt

## 2025-06-26 NOTE — TELEPHONE ENCOUNTER
If he isn't taking it as prescribed, can stop if his blood pressure is well controlled (below 130/80)

## 2025-07-15 ENCOUNTER — OFFICE VISIT (OUTPATIENT)
Age: 75
End: 2025-07-15
Payer: MEDICARE

## 2025-07-15 ENCOUNTER — HOSPITAL ENCOUNTER (OUTPATIENT)
Dept: GENERAL RADIOLOGY | Facility: HOSPITAL | Age: 75
Discharge: HOME | End: 2025-07-15
Attending: ORTHOPAEDIC SURGERY
Payer: MEDICARE

## 2025-07-15 VITALS — WEIGHT: 199 LBS | HEIGHT: 68.5 IN | BODY MASS INDEX: 29.81 KG/M2

## 2025-07-15 DIAGNOSIS — M16.11 PRIMARY OSTEOARTHRITIS OF RIGHT HIP: ICD-10-CM

## 2025-07-15 DIAGNOSIS — R52 PAIN: Primary | ICD-10-CM

## 2025-07-15 DIAGNOSIS — R52 PAIN: ICD-10-CM

## 2025-07-15 PROCEDURE — 72170 X-RAY EXAM OF PELVIS: CPT | Performed by: ORTHOPAEDIC SURGERY

## 2025-07-15 PROCEDURE — 99204 OFFICE O/P NEW MOD 45 MIN: CPT | Performed by: ORTHOPAEDIC SURGERY

## 2025-07-15 NOTE — PROGRESS NOTES
Chief Complaint: Hip Pain (Right hip -  Pt states groin pain. Had hip injection 2016 with 3-5 years of relief. Pain with certain activity. )      HPI  Mr. Quinonez is referred by No ref. provider found. Daniel Quinonez . is a 74 year old male being seen in the office today for Hip Pain (Right hip -  Pt states groin pain. Had hip injection 2016 with 3-5 years of relief. Pain with certain activity. )    Patient is a 74-year-old male who presents today with complaints of right hip pain.  He localizes the pain to the groin and radiates laterally and posteriorly.  He states that has been going on for approximately 20 years.  He has been seen at an outside facility for the hip pain.  He has been diagnosed with osteoarthritis.  He states that he was initially treated with an injection and has had injections a few times.  His last injection was approximately in .  This gave him good relief for several years.  He also has done physical therapy.  This has not provided substantial relief.  He has also used meloxicam without significant relief.  He notes the pain with weightbearing activities.  He denies any significant night pain.  He has trouble putting shoes and socks on, navigating stairs, walking long distances, getting in and out of car or low seated position.  He remains relatively active.  He likes to work around the garden in the yard.  He he does note that this hip limits him from doing so.      History:  Past Medical History[1]  Past Surgical History[2]  Family History[3]  Family Status   Relation Status    Fa     Mo     Other Alive    Sis     Bro     Other (Not Specified)     Social History     Occupational History    Not on file   Tobacco Use    Smoking status: Never     Passive exposure: Never    Smokeless tobacco: Never   Vaping Use    Vaping status: Never Used   Substance and Sexual Activity    Alcohol use: Yes     Alcohol/week: 3.0 - 4.0 standard drinks of alcohol     Types:  3 - 4 Standard drinks or equivalent per week     Comment: 2 times month    Drug use: No    Sexual activity: Not on file       Medications:  Medications - Current[4]    Allergies:  Allergies[5]    Review of Systems  A comprehensive review of systems was completed and is negative unless noted above or in the HPI.    Physical Exam  Ht 5' 8.5\" (1.74 m)   Wt 199 lb (90.3 kg)   BMI 29.82 kg/m²   Body mass index is 29.82 kg/m².    Constitutional: The patient appears well-developed and well-nourished, in no apparent distress.  Psychiatric: The patient demonstrates good comprehension, judgment and decision making. Normal mood and affect.  Eyes: PER and EOM are normal.  ENT: Hearing appropriate for normal conversation.   Cardiovascular: The patient has symmetric pulses, 2+.  Distal extremity is warm and well perfused with good capillary refill.  Respiratory:  The patient is breathing comfortably without increased respiratory effort or use of accessory muscles.  Hematologic/Lymphatic: No lymphangitis. There is no appreciable enlargement of lymph nodes. No calf swelling, calf non-tender, negative Morales's sign. No edema.  Skin: No wounds or ulcers. No hypertrophic scarring.  Neck: FROM without pain.  MSK:    right Hip        Skin: Intact       Deformity: None       Tenderness: None       Range of Motion:      Extension: 0     Flexion Contracture: 0     Flexion: 90     Internal Rotation: 0     External Rotation: 30     Abduction: 20     Adduction: 20       Muscle Strength      Abduction: 5/5     Adduction: 5/5     Flexion: 5/5         Gait: Antalgic   Trendelenburg Lurch Negative   Trendelenburg Sign Negative   Stinchfield: Positive   Jamar Test: Negative   Kayla Test: Negative   Anterior Impingement: Positive   Posterior Impingement: Negative   Leg Length Discrepancy Negative       Motor: intact   Sensory: intact   Vascular: intact      Labs:  Lab Results   Component Value Date    WBC 6.0 06/18/2025    HGB 15.6 06/18/2025     HCT 49.9 06/18/2025    .0 06/18/2025     Lab Results   Component Value Date    ALB 4.7 06/18/2025    A1C 5.4 01/09/2025     Lab Results   Component Value Date    GLU 95 06/18/2025    GLU 86 01/09/2025    GLU 89 12/10/2024         Imaging:  I independently reviewed the patient's relevant imaging studies today.    A standing low-set AP pelvis, and 2 views of the affected hip were obtained today.  There is no evidence of acute fracture, dislocation, or bony lesions.  There is evidence of joint space narrowing with osteophyte formation, and subchondral sclerosis. There is a minimal leg length discrepancy.    Assessment and Plan  Assessment & Plan  Pain    Orders:    XR PELVIS (1 VIEW) (CPT=72170); Future    Primary osteoarthritis of right hip    Orders:    Physiatry Referral - [place] ([facility])      I had a lengthy discussion with the patient today about the patient's hip OA. Non operative and operative treatment options were discussed. In general, non operative management would include activity modification, rest, anti-inflammatories, physical therapy, weight loss if applicable, intraarticular cortisone injections and the use of assistive devices.  If they fail these modalities and continue to have severe life altering pain and functional limitations negatively impacting their quality of life, a total hip arthroplasty may be warranted.      We discussed all options including a cortisone injection versus surgery in the form of a total hip arthroplasty. I had a lengthy discussion about joint replacement.  They were also given AAKS handouts on joint replacement.  Understanding of these these risks, the patient would like to think about his options and let us know what he decides.  In the interim I did place a referral for physiatry if he decides to undergo cortisone injection.  If he decides for surgery then he will give [facility] a call.  I did discuss that if he gets cortisone injection he must wait 3  months after that in order to pursue surgical intervention.    As such, I recommend the following:    Continue activity modification, anti-inflammatory  In the interim, they will continue activity modification, Tylenol, anti-inflammatory medication ( until 1 week prior to surgery date), and assistive device usage for symptomatic relief.  Activities: As tolerated  Follow-up: He will call to let us know what he decides.     Chaka Rock MD         [1]   Past Medical History:   Chronic idiopathic urticaria    Hyperlipidemia    Kidney stone complicating pregnancy, second trimester (HCC)    Kidney stones    Other and unspecified hyperlipidemia    Panic attacks    Tinnitus    10 years     Unspecified essential hypertension   [2]   Past Surgical History:  Procedure Laterality Date    Anesth,surgery of shoulder      right shoulder    Cataract Bilateral     Correct bunion,simple      left foot   [3]   Family History  Problem Relation Age of Onset    Cancer Father         thyroid cancer    Diabetes Mother     Cancer Sister         pancreatic cancer    Cancer Brother         pancreatic cancer    Dementia Other    [4]   Current Outpatient Medications:     VANADYL SULFATE OR, Taking for daibetes, Disp: , Rfl:     Zinc Sulfate 66 MG Oral Tab, Take 1 tablet by mouth daily., Disp: , Rfl:     MAGNESIUM OR, Take 250 mg by mouth As Directed., Disp: , Rfl:     Glucosamine-Chondroit-Vit C-Mn (GLUCOSAMINE 1500 COMPLEX) Oral Cap, Take by mouth As Directed., Disp: , Rfl:     Emollient (COLLAGEN EX), Take 6,000 mg by mouth., Disp: , Rfl:     cyanocobalamin 250 MCG Oral Tab, Take 1 tablet (250 mcg total) by mouth daily., Disp: , Rfl:     Coenzyme Q10 100 MG Oral Cap, Take by mouth As Directed., Disp: , Rfl:     Turmeric Does not apply Powder, Take by mouth As Directed., Disp: , Rfl:     Meloxicam 15 MG Oral Tab, Take 1 tablet (15 mg total) by mouth daily., Disp: 90 tablet, Rfl: 1    azelastine 137 MCG/SPRAY Nasal Solution, 1-2 sprays by  Nasal route in the morning and 1-2 sprays before bedtime. FOR SINUS SYMPTOMS/NASAL CONGESTION., Disp: 30 mL, Rfl: 11    fluticasone propionate 50 MCG/ACT Nasal Suspension, 2 sprays by Each Nare route daily. FOR NASAL CONGESTION/SINUS SYMPTOMS., Disp: 1 each, Rfl: 11    telmisartan 80 MG Oral Tab, Take 1 tablet (80 mg total) by mouth nightly. FOR BLOOD PRESSURE., Disp: 90 tablet, Rfl: 3    ALPRAZolam 0.5 MG Oral Tab, Take 1 tablet (0.5 mg total) by mouth as needed for Anxiety. Will need appointment for refills (call/set up once down to last 7 pills)-Dr. James, Disp: 60 tablet, Rfl: 0    hydroCHLOROthiazide 25 MG Oral Tab, Take 1 tablet (25 mg total) by mouth daily. FOR BLOOD PRESSURE., Disp: 90 tablet, Rfl: 9    rosuvastatin 10 MG Oral Tab, Take 1 tablet (10 mg total) by mouth nightly. FOR CHOLESTEROL., Disp: 90 tablet, Rfl: 9    fluticasone-salmeterol (ADVAIR DISKUS) 250-50 MCG/ACT Inhalation Aerosol Powder, Breath Activated, Inhale 1 puff into the lungs every 12 (twelve) hours., Disp: 3 each, Rfl: 3  [5]   Allergies  Allergen Reactions    Alfuzosin SHORTNESS OF BREATH    Amlodipine HIVES    Hydrocodone-Acetaminophen OTHER (SEE COMMENTS)     Extreme upset stomach .    Oxycodone UNKNOWN     Perhaps hydrocodone as well; reaction = hives

## 2025-08-05 ENCOUNTER — TELEPHONE (OUTPATIENT)
Facility: CLINIC | Age: 75
End: 2025-08-05

## 2025-08-05 ENCOUNTER — TELEPHONE (OUTPATIENT)
Dept: ORTHOPEDICS CLINIC | Facility: CLINIC | Age: 75
End: 2025-08-05

## 2025-08-05 DIAGNOSIS — M16.11 PRIMARY OSTEOARTHRITIS OF RIGHT HIP: Primary | ICD-10-CM

## 2025-08-22 ENCOUNTER — HOSPITAL ENCOUNTER (OUTPATIENT)
Age: 75
Discharge: HOME | End: 2025-08-22

## 2025-08-22 ENCOUNTER — APPOINTMENT (OUTPATIENT)
Dept: GENERAL RADIOLOGY | Age: 75
End: 2025-08-22
Attending: NURSE PRACTITIONER

## 2025-08-22 VITALS
TEMPERATURE: 99 F | SYSTOLIC BLOOD PRESSURE: 125 MMHG | RESPIRATION RATE: 20 BRPM | OXYGEN SATURATION: 100 % | DIASTOLIC BLOOD PRESSURE: 73 MMHG | HEART RATE: 94 BPM

## 2025-08-22 DIAGNOSIS — S69.91XA INJURY OF FINGER OF RIGHT HAND, INITIAL ENCOUNTER: Primary | ICD-10-CM

## 2025-08-22 PROCEDURE — 73140 X-RAY EXAM OF FINGER(S): CPT | Performed by: NURSE PRACTITIONER

## 2025-08-22 PROCEDURE — 99213 OFFICE O/P EST LOW 20 MIN: CPT | Performed by: NURSE PRACTITIONER

## (undated) NOTE — ED AVS SNAPSHOT
Margaret Mims Sr. MRN: [id number]    Department:  [facility] Emergency Department   Date of Visit:  3/11/2018           Disclosure     Insurance plans vary and the physician(s) referred by the ER may not be covered by your plan.  Please con within the next three months to obtain basic health screening including reassessment of your blood pressure.     IF THERE IS ANY CHANGE OR WORSENING OF YOUR CONDITION, CALL YOUR PRIMARY CARE PHYSICIAN AT ONCE OR RETURN IMMEDIATELY TO THE EMERGENCY DEPARTMEN

## (undated) NOTE — ED AVS SNAPSHOT
Matteo Lao Sr. MRN: [id number]    Department:  [facility] Department   Date of Visit:  7/17/2019           Disclosure     Insurance plans vary and the physician(s) referred by the ER may not be covered by your plan.  Please con within the next three months to obtain basic health screening including reassessment of your blood pressure.     IF THERE IS ANY CHANGE OR WORSENING OF YOUR CONDITION, CALL YOUR PRIMARY CARE PHYSICIAN AT ONCE OR RETURN IMMEDIATELY TO THE EMERGENCY DEPARTMEN

## (undated) NOTE — LETTER
9/10/2020              [place]         Xolair Order Set:    1. Please observe patient for at least 30 minutes after receiving Xolair to ensure no signs of reactions.     2. EpiPen 0.3

## (undated) NOTE — Clinical Note
Armando Pino, clearance done for Daniel,they have a visit with you today, feel free to proceed as soon as you medically need to, no issues on my end for pre-op, note attached. Appreciate you! -Ry

## (undated) NOTE — MR AVS SNAPSHOT
After Visit Summary   3/11/2021    Akua Amado Sr. MRN: [id number]           Visit Information     Date & Time  3/11/2021  3:00 PM Provider  [facility] [address] Dept[address]  Phone  [phone] mg by mouth every 6 (six) hours as needed for Pain. EPINEPHrine 0.3 MG/0.3ML Injection Solution Auto-injector Inject 0.3 mL (1 each total) into the muscle daily as needed. Misc Natural Products (PROSTATE THERAPY COMPLEX OR) Take by mouth.     Chromium face-to-face with a [facility] physician or   [facility] using your mobile device or computer   using [employer].    e-VISITS  Communicate with a [facility] Physician or [facility] online. The physician will respond and provide   a treatment plan within a few hours.  ONLINE VISIT  Piotr

## (undated) NOTE — MR AVS SNAPSHOT
After Visit Summary   1/14/2021    Luis Chaudhry Sr. MRN: [id number]           Visit Information     Date & Time  1/14/2021  3:00 PM Provider  EM [address] [place] [address] Dept.  Phone  [phone] 3 tablets daily for four days, then 2 tablets daily for four days, then 1 tablet daily for four days. Then stop. predniSONE 10 MG Oral Tab Take 1 tablet (10 mg total) by mouth See Admin Instructions.  Take 4 tabs (40mg) daily x 5 days  Take 3 tabs (30mg) strive to deliver the best patient experience and are looking for ways to make improvements. Your feedback will help us do so. For more information on [employer], please visit [email or web address]                   DO YOU KNOW [facility] based on your insurance coverage  For more information about hours, locations or appointment options available at [facility],   visit [email or web address][email or web address] or call [phone]. ([phone])

## (undated) NOTE — ED AVS SNAPSHOT
Maday Yancey Sr. MRN: [id number]    Department:  [facility] Emergency Department   Date of Visit:  6/18/2018           Disclosure     Insurance plans vary and the physician(s) referred by the ER may not be covered by your plan.  Please con within the next three months to obtain basic health screening including reassessment of your blood pressure.     IF THERE IS ANY CHANGE OR WORSENING OF YOUR CONDITION, CALL YOUR PRIMARY CARE PHYSICIAN AT ONCE OR RETURN IMMEDIATELY TO THE EMERGENCY DEPARTMEN

## (undated) NOTE — MR AVS SNAPSHOT
After Visit Summary   4/8/2021    Santoshumberto Keita Sr. MRN: [id number]           Visit Information     Date & Time  4/8/2021  3:00 PM Provider  [facility] [address] Dept[address]  Phone  [phone] hours as needed for Itching. melatonin 5 MG Oral Cap Take 5 mg by mouth nightly. ibuprofen 600 MG Oral Tab Take 600 mg by mouth every 6 (six) hours as needed for Pain. Misc Natural Products (PROSTATE THERAPY COMPLEX OR) Take by mouth.     Chromium face-to-face with a [facility] physician or   [facility] using your mobile device or computer   using [employer].    e-VISITS  Communicate with a [facility] Physician or [facility] online. The physician will respond and provide   a treatment plan within a few hours.  ONLINE VISIT  Piotr

## (undated) NOTE — ED AVS SNAPSHOT
Santos Keita Sr. MRN: [id number]    Department:  [facility] Emergency Department   Date of Visit:  8/3/2019           Disclosure     Insurance plans vary and the physician(s) referred by the ER may not be covered by your plan.  Please cont within the next three months to obtain basic health screening including reassessment of your blood pressure.     IF THERE IS ANY CHANGE OR WORSENING OF YOUR CONDITION, CALL YOUR PRIMARY CARE PHYSICIAN AT ONCE OR RETURN IMMEDIATELY TO THE EMERGENCY DEPARTMEN

## (undated) NOTE — Clinical Note
Armando, seeing Daniel today, not sure what the procedure is for but he told me fluid behind the right eye? Will clear him today, sounds like this is an urgent matter? They wuill be seeing you tomorrow. If you need to do interventions sooner feel free, he's clear on my end. [phone] if you need.